# Patient Record
Sex: FEMALE | Race: WHITE | NOT HISPANIC OR LATINO | Employment: OTHER | URBAN - METROPOLITAN AREA
[De-identification: names, ages, dates, MRNs, and addresses within clinical notes are randomized per-mention and may not be internally consistent; named-entity substitution may affect disease eponyms.]

---

## 2017-02-22 ENCOUNTER — GENERIC CONVERSION - ENCOUNTER (OUTPATIENT)
Dept: OTHER | Facility: OTHER | Age: 67
End: 2017-02-22

## 2017-03-05 LAB
ALBUMIN SERPL BCP-MCNC: 3.9 G/DL
ALP SERPL-CCNC: 121 U/L
ALT SERPL W P-5'-P-CCNC: 48 U/L
AST SERPL W P-5'-P-CCNC: 34 U/L
BUN SERPL-MCNC: 21 MG/DL
CALCIUM SERPL-MCNC: 9.6 MG/DL
CHLORIDE SERPL-SCNC: 102 MMOL/L
CHOLEST SERPL-MCNC: 275 MG/DL
CHOLEST/HDLC SERPL: 5.29 {RATIO}
CO2 SERPL-SCNC: 28 MMOL/L
CREAT SERPL-MCNC: 0.78 MG/DL
EST. AVERAGE GLUCOSE BLD GHB EST-MCNC: 148 MG/DL
GLUCOSE (HISTORICAL): 145
HBA1C MFR BLD HPLC: 6.8 %
HDLC SERPL-MCNC: 52 MG/DL
LDL CHOLESTEROL (HISTORICAL): 179
NON-HDL-CHOL (CHOL-HDL) (HISTORICAL): 223
POTASSIUM SERPL-SCNC: 4.3 MMOL/L
SODIUM SERPL-SCNC: 141 MMOL/L
TOTAL PROTEIN (HISTORICAL): 7.3
TRIGL SERPL-MCNC: 219 MG/DL
TSH SERPL DL<=0.05 MIU/L-ACNC: 2.73 M[IU]/L

## 2017-04-17 DIAGNOSIS — Z78.0 ASYMPTOMATIC MENOPAUSAL STATE: ICD-10-CM

## 2017-04-17 DIAGNOSIS — Z12.31 ENCOUNTER FOR SCREENING MAMMOGRAM FOR MALIGNANT NEOPLASM OF BREAST: ICD-10-CM

## 2017-04-18 ENCOUNTER — ALLSCRIPTS OFFICE VISIT (OUTPATIENT)
Dept: OTHER | Facility: OTHER | Age: 67
End: 2017-04-18

## 2017-05-31 ENCOUNTER — GENERIC CONVERSION - ENCOUNTER (OUTPATIENT)
Dept: OTHER | Facility: OTHER | Age: 67
End: 2017-05-31

## 2017-07-26 ENCOUNTER — GENERIC CONVERSION - ENCOUNTER (OUTPATIENT)
Dept: OTHER | Facility: OTHER | Age: 67
End: 2017-07-26

## 2017-09-11 ENCOUNTER — GENERIC CONVERSION - ENCOUNTER (OUTPATIENT)
Dept: OTHER | Facility: OTHER | Age: 67
End: 2017-09-11

## 2017-09-11 ENCOUNTER — ALLSCRIPTS OFFICE VISIT (OUTPATIENT)
Dept: OTHER | Facility: OTHER | Age: 67
End: 2017-09-11

## 2017-12-08 ENCOUNTER — ALLSCRIPTS OFFICE VISIT (OUTPATIENT)
Dept: OTHER | Facility: OTHER | Age: 67
End: 2017-12-08

## 2017-12-09 NOTE — PROGRESS NOTES
Assessment    1  Neck pain (723 1) (M54 2)   2  Acute URI (465 9) (J06 9)   3  Herpes zoster (053 9) (B02 9)    Plan  Neck pain    · Carisoprodol 350 MG Oral Tablet; TAKE 1 TABLET Twice daily PRN   · Lidoderm 5 % External Patch (Lidocaine); APPLY 1 PATCH TO THE AFFECTEDAREA AND LEAVE IN PLACE FOR 12 HOURS, THEN REMOVE AND LEAVE OFF FOR12 HOURS   · Oxycodone-Acetaminophen 5-325 MG Oral Tablet; 1-2 at HS prn    Discussion/Summary    --bilateral posterior neck pain: Symptoms x 4 days  No trauma  May be due to preceding URI with cough  Recommend Rx Soma 350 b i d  p r n , Lidoderm patches  I also gave her a small amount of oxycodone/apap 5/325 (#10)  Recommend ice and heat  Call in 1 week if symptoms are not improved (will sent for x-rays)URI; resolvedshingles: Left-sided forehead  Patient was seen September 11th and given 7 days of antiviral therapy  Symptoms have resolvedfurther problems  Recent shingles   Possible side effects of new medications were reviewed with the patient/guardian today  The treatment plan was reviewed with the patient/guardian  The patient/guardian understands and agrees with the treatment plan      Chief Complaint    1  Neck Pain  Patient presents with c/o neck pain x a few days  History of Present Illness  HPI: 4 day hx of b/l post neck pain, radiating into both her traps  no trauma, but pt recently has gotten over a cold  She also recently had shingles in September      Review of Systems   Constitutional: No fever, no chills, feels well, no tiredness, no recent weight gain or loss  ENT: as noted in HPI  Cardiovascular: no complaints of slow or fast heart rate, no chest pain, no palpitations, no leg claudication or lower extremity edema  Respiratory: no complaints of shortness of breath, no wheezing, no dyspnea on exertion, no orthopnea or PND  Musculoskeletal: as noted in HPI  Active Problems  1  Asymptomatic age-related postmenopausal state (V49 81) (Z78 0)   2   Crohn's disease (555 9) (K50 90)   3  Diabetes mellitus, type 2 (250 00) (E11 9)   4  Encounter for screening mammogram for breast cancer (V76 12) (Z12 31)   5  Esophagitis (530 10) (K20 9)   6  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)   7  Fatigue (780 79) (R53 83)   8  Herpes zoster (053 9) (B02 9)   9  Hyperlipidemia (272 4) (E78 5)   10  Hypomagnesemia (275 2) (E83 42)   11  Inflamed seborrheic keratosis (702 11) (L82 0)   12  Influenza vaccine needed (V04 81) (Z23)   13  Insomnia (780 52) (G47 00)   14  Long term current use of systemic steroids (V58 65) (Z79 52)   15  Low vitamin D level (268 9) (E55 9)   16  Migraine with aura and without status migrainosus, not intractable (346 00) (G43 109)   17  Primary hypothyroidism (244 9) (E03 9)   18  Screening for depression (V79 0) (Z13 89)   19  Screening for other and unspecified genitourinary condition (V81 6) (Z13 89)   20  Sleep disorder (780 50) (G47 9)   21  Special screening for other neurological conditions (V80 09) (Z13 89)   22  Ulcerative colitis (556 9) (K51 90)   23  Vitamin B12 deficiency (266 2) (E53 8)    Past Medical History  1  History of Acute respiratory disease (465 9) (J06 9)   2  History of Acute sinusitis (461 9) (J01 90)   3  History of Acute upper respiratory infection (465 9) (J06 9)   4  History of Cervical adenitis (289 3) (I88 9)   5  History of Chills (780 64) (R68 83)   6  History of Elbow pain, left (719 42) (M25 522)   7  History of Flu-like symptoms (780 99) (R68 89)   8  History of Headache (784 0) (R51)   9  History of acute bronchitis (V12 69) (Z87 09)   10  History of acute bronchitis (V12 69) (Z87 09)   11  History of acute sinusitis (V12 69) (Z87 09)   12  History of headache (V13 89) (Z87 898)   13  History of Hordeolum externum, unspecified laterality (373 11) (H00 019)   14  History of Influenza B (487 1) (J10 1)   15  History of Neck Sprain (847 0)   16   History of Need for immunization against influenza (V04 81) (Z23) 17  History of Need for immunization against influenza (V04 81) (Z23)   18  History of Need for pneumococcal vaccine (V03 82) (Z23)   19  History of Plantar fasciitis of left foot (728 71) (M72 2)   20  History of Prediabetes (790 29) (R73 03)   21  History of Visit for pre-operative examination (V72 84) (Z01 818)   22  History of Visit For:   21  History of Visit For: 4-month Visit (V20 2)   24  History of Wrist Sprain (842 00)  Active Problems And Past Medical History Reviewed: The active problems and past medical history were reviewed and updated today  Family History  Mother    1  Family history of Inflammatory bowel disease  Father    2  Family history of Esophageal stenosis    Social History   · Never A Smoker  The social history was reviewed and updated today  The social history was reviewed and is unchanged  Surgical History    1  History of Cholecystectomy   2  History of Total Abdominal Hysterectomy    Current Meds   1  Butalbital-APAP-Caffeine -40 MG Oral Capsule; TAKE 1 CAPSULE Every 6 hours PRN; Therapy: 58BYT5942 to (Last Rx:26Jun2014) Ordered   2  Esomeprazole Magnesium 40 MG Oral Capsule Delayed Release; Take 1 capsule twice daily; Therapy: 84CEM0923 to (Evaluate:80Lbg3252)  Requested for: 77Hnj0350; Last Rx:26Pmh6451 Ordered   3  Levothyroxine Sodium 50 MCG Oral Tablet; take 1 tablet by mouth daily as directed; Therapy: 35VVY3099 to [phone])  Requested for: 68VXT5769; Last Rx:11Ink7471 Ordered   4  Prevalite 4 GM/DOSE Oral Powder; PLACE CONTENTS OF 1 LEVEL SCOOPFUL IN GLASS  ADD 6 OUNCES OF WATER  STIR TO UNIFORM CONSISTENCY AND DRINK; Therapy: 32WXV4927 to Recorded   5  SUMAtriptan 20 MG/ACT Nasal Solution; USE 1 SPRAY INTO ONE NOSTRIL AS NEEDED FOR MIGRAINE RELIEF, MAY REPEAT ONCE AFTER 2 HOURS  MAX - 40MG/DAY; Therapy: 40UJF9493 to (Last Rx:09Nov2015) Ordered   6   SUMAtriptan Succinate 100 MG Oral Tablet; TALE 1 TABLET BY MOUTH AT ONSET OF MIGRAINE HEADACHE, MAY REPEAT IN 2HOURS (MAX 2 TABS/24 HRS); Therapy: 82RXO6449 to (Nyoka Lawrence)  Requested for: 44Zwp3075; Last Rx:56Jqv2871; Status: ACTIVE - Transmit to Pharmacy - Awaiting Verification Ordered   7  Zolpidem Tartrate 10 MG Oral Tablet; take 1 tablet by mouth every day; Therapy: 39NAW4104 to (NXPFIDGL:61MTA3906)  Requested for: 69BDX5577; Last Rx:23Oct2017 Ordered    The medication list was reviewed and updated today  Allergies  1  Tamiflu   2  Azulfidine TABS   3  [employer] Red Yeast Rice CAPS   4  Latex Exam Gloves MISC   5  Sulfa Drugs   6  Tetracyclines  7  Latex    Vitals   Recorded: [id number] 01:55PM   Temperature 96 F, Tympanic   Heart Rate 105   Pulse Quality Normal   Systolic 051, RUE, Sitting   Diastolic 90, RUE, Sitting   BP CUFF SIZE Large   Height 5 ft    Weight 150 lb 4 oz   BMI Calculated 29 34   BSA Calculated 1 65   O2 Saturation 98, RA       Signatures   Electronically signed by :  Christin Moore DO; Dec  8 2017  2:26PM EST                       (Author)

## 2017-12-18 ENCOUNTER — ALLSCRIPTS OFFICE VISIT (OUTPATIENT)
Dept: OTHER | Facility: OTHER | Age: 67
End: 2017-12-18

## 2017-12-18 DIAGNOSIS — E83.42 HYPOMAGNESEMIA: ICD-10-CM

## 2017-12-18 DIAGNOSIS — E11.9 TYPE 2 DIABETES MELLITUS WITHOUT COMPLICATIONS (HCC): ICD-10-CM

## 2017-12-18 DIAGNOSIS — E78.5 HYPERLIPIDEMIA: ICD-10-CM

## 2017-12-18 DIAGNOSIS — E03.9 HYPOTHYROIDISM: ICD-10-CM

## 2017-12-18 DIAGNOSIS — K50.90 CROHN'S DISEASE WITHOUT COMPLICATION (HCC): ICD-10-CM

## 2017-12-18 DIAGNOSIS — G43.109 MIGRAINE WITH AURA AND WITHOUT STATUS MIGRAINOSUS, NOT INTRACTABLE: ICD-10-CM

## 2017-12-20 NOTE — PROGRESS NOTES
Assessment  1  Acute bronchitis (466 0) (J20 9)    Plan  Acute bronchitis    · LevoFLOXacin 500 MG Oral Tablet; TAKE 1 TABLET DAILY  Crohn's disease, Diabetes mellitus, type 2, Hyperlipidemia, Hypomagnesemia, Migrainewith aura and without status migrainosus, not intractable, Primary hypothyroidism    · (1) CBC/PLT/DIFF; Status:Active; Requested for:38Dcz7639;    · (1) COMPREHENSIVE METABOLIC PANEL; Status:Active; Requested for:24Rgc3837;    · (1) HEMOGLOBIN A1C; Status:Active; Requested for:63Tps0197;    · (1) LIPID PANEL FASTING W DIRECT LDL REFLEX; Status:Active; Requestedfor:83Ibj7129;    · (1) TSH WITH FT4 REFLEX; Status:Active; Requested for:93Hmg0281;     Discussion/Summary    Patient is a 65-year-old female1  Acute bronchitis - start supportive care  Maintain proper hydration  Take over-the-counter Mucinex for symptom relief  Start treatment with Levaquin  Follow up if any symptoms are persisting  Chief Complaint  Patient presents for sore throat, swollen glands and left ear  Patient also C/O rash on her left side x1 week  History of Present Illness  HPI: Patient is a 65-year-old female presents today with CC of sore throat for the past week  Symptoms progressed  She had associated chills, ear discomfort, and swollen glands  She also describes a small rash over her body  Denies any fevers or chills  She states that she has not been taking anything for her symptoms  Review of Systems   Constitutional: not feeling poorly-- and-- not feeling tired  ENT: no sore throat-- and-- no nasal discharge  Cardiovascular: no chest pain-- and-- no palpitations  Respiratory: no cough-- and-- no shortness of breath during exertion  Gastrointestinal: no abdominal pain,-- no nausea-- and-- no diarrhea  Genitourinary: no pelvic pain-- and-- no dysmenorrhea  Musculoskeletal: no joint swelling-- and-- no joint stiffness  Integumentary: no itching-- and-- no skin wound    Neurological: no numbness-- and-- no dizziness  Active Problems  1  Acute URI (465 9) (J06 9)   2  Asymptomatic age-related postmenopausal state (V49 81) (Z78 0)   3  Crohn's disease (555 9) (K50 90)   4  Diabetes mellitus, type 2 (250 00) (E11 9)   5  Encounter for screening mammogram for breast cancer (V76 12) (Z12 31)   6  Esophagitis (530 10) (K20 9)   7  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)   8  Fatigue (780 79) (R53 83)   9  Herpes zoster (053 9) (B02 9)   10  Hyperlipidemia (272 4) (E78 5)   11  Hypomagnesemia (275 2) (E83 42)   12  Inflamed seborrheic keratosis (702 11) (L82 0)   13  Influenza vaccine needed (V04 81) (Z23)   14  Insomnia (780 52) (G47 00)   15  Long term current use of systemic steroids (V58 65) (Z79 52)   16  Low vitamin D level (268 9) (E55 9)   17  Migraine with aura and without status migrainosus, not intractable (346 00) (G43 109)   18  Neck pain (723 1) (M54 2)   19  Primary hypothyroidism (244 9) (E03 9)   20  Screening for depression (V79 0) (Z13 89)   21  Screening for other and unspecified genitourinary condition (V81 6) (Z13 89)   22  Sleep disorder (780 50) (G47 9)   23  Special screening for other neurological conditions (V80 09) (Z13 89)   24  Ulcerative colitis (556 9) (K51 90)   25  Vitamin B12 deficiency (266 2) (E53 8)    Past Medical History  1  History of Acute respiratory disease (465 9) (J06 9)   2  History of Acute sinusitis (461 9) (J01 90)   3  History of Acute upper respiratory infection (465 9) (J06 9)   4  History of Cervical adenitis (289 3) (I88 9)   5  History of Chills (780 64) (R68 83)   6  History of Elbow pain, left (719 42) (M25 522)   7  History of Flu-like symptoms (780 99) (R68 89)   8  History of Headache (784 0) (R51)   9  History of acute bronchitis (V12 69) (Z87 09)   10  History of acute bronchitis (V12 69) (Z87 09)   11  History of acute sinusitis (V12 69) (Z87 09)   12  History of headache (V13 89) (Z87 898)   13   History of Hordeolum externum, unspecified laterality (373 11) (H00 019)   14  History of Influenza B (487 1) (J10 1)   15  History of Neck Sprain (847 0)   16  History of Need for immunization against influenza (V04 81) (Z23)   17  History of Need for immunization against influenza (V04 81) (Z23)   18  History of Need for pneumococcal vaccine (V03 82) (Z23)   19  History of Plantar fasciitis of left foot (728 71) (M72 2)   20  History of Prediabetes (790 29) (R73 03)   21  History of Visit for pre-operative examination (V72 84) (Z01 818)   22  History of Visit For:   21  History of Visit For: 4-month Visit (V20 2)   24  History of Wrist Sprain (842 00)  Active Problems And Past Medical History Reviewed: The active problems and past medical history were reviewed and updated today  Family History  Mother    1  Family history of Inflammatory bowel disease  Father    2  Family history of Esophageal stenosis  Family History Reviewed: The family history was reviewed and updated today  Social History   · Never A Smoker  The social history was reviewed and updated today  The social history was reviewed and is unchanged  Surgical History  1  History of Cholecystectomy   2  History of Total Abdominal Hysterectomy  Surgical History Reviewed: The surgical history was reviewed and updated today  Current Meds   1  Butalbital-APAP-Caffeine -40 MG Oral Capsule; TAKE 1 CAPSULE Every 6 hours PRN; Therapy: 45CQC2849 to (Last Rx:26Jun2014) Ordered   2  Esomeprazole Magnesium 40 MG Oral Capsule Delayed Release; Take 1 capsule twice daily; Therapy: 20SEE9126 to (Evaluate:40Uxd5119)  Requested for: 38Qcy3220; Last Rx:01Xxc3166 Ordered   3  Levothyroxine Sodium 50 MCG Oral Tablet; take 1 tablet by mouth daily as directed; Therapy: 98UYL7185 to )  Requested for: 86IAY8811; Last Rx:22Oct2017 Ordered   4   Lidoderm 5 % External Patch; APPLY 1 PATCH TO THE AFFECTED AREA AND LEAVE IN PLACE FOR 12 HOURS, THEN REMOVE AND LEAVE OFF FOR 12 HOURS; Therapy: 73RVH0433 to (Evaluate:31Psm4894); Last Rx:43Fmk4942 Ordered   5  Oxycodone-Acetaminophen 5-325 MG Oral Tablet; 1-2 at HS prn; Therapy: 00RVC5682 to (Last Rx:02Zct3820) Ordered   6  Prevalite 4 GM/DOSE Oral Powder; PLACE CONTENTS OF 1 LEVEL SCOOPFUL IN GLASS  ADD 6 OUNCES OF WATER  STIR TO UNIFORM CONSISTENCY AND DRINK; Therapy: 23TRE3736 to Recorded   7  SUMAtriptan 20 MG/ACT Nasal Solution; USE 1 SPRAY INTO ONE NOSTRIL AS NEEDED FOR MIGRAINE RELIEF, MAY REPEAT ONCE AFTER 2 HOURS  MAX - 40MG/DAY; Therapy: 47QKM3398 to (Last Rx:09Nov2015) Ordered   8  SUMAtriptan Succinate 100 MG Oral Tablet; TALE 1 TABLET BY MOUTH AT ONSET OF MIGRAINE HEADACHE, MAY REPEAT IN 2HOURS (MAX 2 TABS/24 HRS); Therapy: 12YDX8068 to (Jaime Kerr)  Requested for: 02Isx7390; Last Rx:90Sav4594; Status: ACTIVE - Transmit to Pharmacy - Awaiting Verification Ordered   9  TiZANidine HCl - 2 MG Oral Tablet; Take 1 tablet twice daily; Therapy: 64Tyq4388 to (Evaluate:11Jan2018)  Requested for: 16Zpd0709; Last Rx:27Iix5562 Ordered   10  Zolpidem Tartrate 10 MG Oral Tablet; take 1 tablet by mouth every day; Therapy: 17WQD0674 to (JIHQVXGT:56RRD5051)  Requested for: 90XKM8218; Last  Rx:60Hia8230 Ordered    The medication list was reviewed and updated today  Allergies  1  Tamiflu   2  Azulfidine TABS   3  [employer] Red Yeast Rice CAPS   4  Latex Exam Gloves MISC   5  Sulfa Drugs   6  Tetracyclines  7  Latex    Vitals   Recorded: 23GDI3670 11:01AM   Temperature 98 3 F, Tympanic   Heart Rate 90   Pulse Quality Normal   Respiration Quality Normal   Respiration 16   Systolic 524, RUE, Sitting   Diastolic 78, RUE, Sitting   Height 5 ft    Weight 149 lb 6 oz   BMI Calculated 29 17   BSA Calculated 1 65   O2 Saturation 98, RA   Pain Scale 0     Physical Exam   Constitutional  General appearance: No acute distress, well appearing and well nourished  Eyes  Conjunctiva and lids: No swelling, erythema or discharge     Pupils and irises: Equal, round and reactive to light  Ears, Nose, Mouth, and Throat  External inspection of ears and nose: Normal    Nasal mucosa, septum, and turbinates: Normal without edema or erythema  Oropharynx: Normal with no erythema, edema, exudate or lesions  Pulmonary  Respiratory effort: No increased work of breathing or signs of respiratory distress  Auscultation of lungs: Clear to auscultation  Cardiovascular  Auscultation of heart: Normal rate and rhythm, normal S1 and S2, without murmurs  Examination of extremities for edema and/or varicosities: Normal    Abdomen  Abdomen: Non-tender, no masses  Liver and spleen: No hepatomegaly or splenomegaly  Lymphatic  Palpation of lymph nodes in neck: No lymphadenopathy  Musculoskeletal  Gait and station: Normal    Inspection/palpation of joints, bones, and muscles: Normal    Skin  Skin and subcutaneous tissue: Normal without rashes or lesions  Signatures   Electronically signed by :  Reggie Lopez DO; Dec 19 2017  1:53PM EST                       (Author)

## 2018-01-12 VITALS
HEIGHT: 60 IN | BODY MASS INDEX: 31.24 KG/M2 | DIASTOLIC BLOOD PRESSURE: 92 MMHG | WEIGHT: 159.13 LBS | OXYGEN SATURATION: 97 % | TEMPERATURE: 98.5 F | RESPIRATION RATE: 16 BRPM | SYSTOLIC BLOOD PRESSURE: 148 MMHG | HEART RATE: 109 BPM

## 2018-01-13 VITALS
SYSTOLIC BLOOD PRESSURE: 166 MMHG | OXYGEN SATURATION: 98 % | RESPIRATION RATE: 16 BRPM | BODY MASS INDEX: 29.95 KG/M2 | HEIGHT: 60 IN | HEART RATE: 105 BPM | WEIGHT: 152.56 LBS | TEMPERATURE: 98.4 F | DIASTOLIC BLOOD PRESSURE: 108 MMHG

## 2018-01-16 NOTE — RESULT NOTES
Verified Results  XR ELBOW 2 VIEW LEFT [id number] 09:10AM [facility] Order Number: [id number]     Test Name Result Flag Reference   XR ELBOW 2 VW LEFT (Report)     LEFT ELBOW     INDICATION: Left elbow pain  COMPARISON: None     VIEWS: 2; 2 images     FINDINGS:     There is no acute fracture or dislocation  There is no joint effusion  No degenerative changes  No lytic or blastic lesions are seen  Soft tissues are unremarkable  IMPRESSION:     No acute osseous abnormality         Workstation performed: [id number]     Signed by:   Joe Sanchez MD   6/8/16

## 2018-01-22 VITALS
BODY MASS INDEX: 29.33 KG/M2 | RESPIRATION RATE: 16 BRPM | WEIGHT: 149.38 LBS | OXYGEN SATURATION: 98 % | DIASTOLIC BLOOD PRESSURE: 78 MMHG | HEART RATE: 90 BPM | HEIGHT: 60 IN | TEMPERATURE: 98.3 F | SYSTOLIC BLOOD PRESSURE: 122 MMHG

## 2018-01-22 VITALS
BODY MASS INDEX: 29.5 KG/M2 | OXYGEN SATURATION: 98 % | WEIGHT: 150.25 LBS | HEIGHT: 60 IN | HEART RATE: 105 BPM | DIASTOLIC BLOOD PRESSURE: 90 MMHG | SYSTOLIC BLOOD PRESSURE: 142 MMHG | TEMPERATURE: 96 F

## 2018-01-23 ENCOUNTER — ALLSCRIPTS OFFICE VISIT (OUTPATIENT)
Dept: OTHER | Facility: OTHER | Age: 68
End: 2018-01-23

## 2018-01-24 NOTE — PROGRESS NOTES
Assessment   1  Acute bronchitis (466 0) (J20 9)   2  Diabetes mellitus, type 2 (250 00) (E11 9)   3  Neck pain (723 1) (M54 2)   4  Nausea (787 02) (R11 0)   5  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)   6  Primary hypothyroidism (244 9) (E03 9)    Plan   Acute bronchitis    · Benzonatate 200 MG Oral Capsule; TAKE 1 CAPSULE 3 TIMES DAILY AS    NEEDED   · Cefuroxime Axetil 500 MG Oral Tablet; TAKE 1 TABLET 2 TIMES DAILY AFTER    MEALS  Nausea    · Ondansetron 4 MG Oral Tablet Disintegrating; Dissolve one tablet in mouth three    times daily as needed  Neck pain    · Carisoprodol 350 MG Oral Tablet; TAKE 1 TABLET 3 TIMES DAILY AS NEEDED    Discussion/Summary      Discussed OTC cold meds  I prescribed her Zofran to take PRN for nausea  Care, ER instructions given  5-7 days if not resolved  refilled Carisoprodol for pt  today at her request   pt's recent FBW results  , A1C 6 2, both reflecting good diabetic control with diet  Her lipids have improved overall but still not near goal  Pt is averse to taking prescription cholesterol medication  I told her we can give it another 4-6 months to see if her numbers further improve and, if not, will need to have further discussion  Her TSH and rest of labs look okay  She is to continue her chronic meds at current doses  Possible side effects of new medications were reviewed with the patient/guardian today  The treatment plan was reviewed with the patient/guardian  The patient/guardian understands and agrees with the treatment plan      Chief Complaint   Pt presents with possible bronchitis x 4 days  Pt would like a Rx for Carisoprodol 350 mg  History of Present Illness   HPI: Pt  presents with a 4 day history of fatigue, dry cough with intermittent productivity brought out by an expectorant, mild nasal congestion  Denies ST  Had fever and chills which did resolve as well as body aches   Denies vomiting but has had some nausea and loose stools for which she took Imodium  She has also taken Tylenol for fever  Denies hx of asthma/allergies  Does not smoke  She is due for Pneumovax-23  She has not reviewed recent FBW that was drawn last month  Review of Systems        Constitutional: as noted in HPI       ENT: as noted in HPI  Cardiovascular: no complaints of slow or fast heart rate, no chest pain, no palpitations, no leg claudication or lower extremity edema  Respiratory: as noted in HPI  Gastrointestinal: as noted in HPI  Genitourinary: no complaints of dysuria, no incontinence, no pelvic pain, no dysmenorrhea, no vaginal discharge or abnormal vaginal bleeding  Active Problems   1  Acute bronchitis (466 0) (J20 9)   2  Asymptomatic age-related postmenopausal state (V49 81) (Z78 0)   3  Crohn's disease (555 9) (K50 90)   4  Diabetes mellitus, type 2 (250 00) (E11 9)   5  Encounter for screening mammogram for breast cancer (V76 12) (Z12 31)   6  Esophagitis (530 10) (K20 9)   7  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)   8  Fatigue (780 79) (R53 83)   9  Herpes zoster (053 9) (B02 9)   10  Hypomagnesemia (275 2) (E83 42)   11  Inflamed seborrheic keratosis (702 11) (L82 0)   12  Influenza vaccine needed (V04 81) (Z23)   13  Insomnia (780 52) (G47 00)   14  Long term current use of systemic steroids (V58 65) (Z79 52)   15  Low vitamin D level (268 9) (E55 9)   16  Migraine with aura and without status migrainosus, not intractable (346 00) (G43 109)   17  Neck pain (723 1) (M54 2)   18  Primary hypothyroidism (244 9) (E03 9)   19  Screening for depression (V79 0) (Z13 89)   20  Screening for other and unspecified genitourinary condition (V81 6) (Z13 89)   21  Sleep disorder (780 50) (G47 9)   22  Special screening for other neurological conditions (V80 09) (Z13 89)   23  Ulcerative colitis (556 9) (K51 90)   24  Vitamin B12 deficiency (266 2) (E53 8)    Past Medical History   1   History of Acute respiratory disease (465 9) (J06 9) 2  History of Acute sinusitis (461 9) (J01 90)   3  History of Acute upper respiratory infection (465 9) (J06 9)   4  History of Cervical adenitis (289 3) (I88 9)   5  History of Chills (780 64) (R68 83)   6  History of Elbow pain, left (719 42) (M25 522)   7  History of Flu-like symptoms (780 99) (R68 89)   8  History of Headache (784 0) (R51)   9  History of acute bronchitis (V12 69) (Z87 09)   10  History of acute bronchitis (V12 69) (Z87 09)   11  History of acute sinusitis (V12 69) (Z87 09)   12  History of headache (V13 89) (Z87 898)   13  History of Hordeolum externum, unspecified laterality (373 11) (H00 019)   14  History of Influenza B (487 1) (J10 1)   15  History of Neck Sprain (847 0)   16  History of Need for immunization against influenza (V04 81) (Z23)   17  History of Need for immunization against influenza (V04 81) (Z23)   18  History of Need for pneumococcal vaccine (V03 82) (Z23)   19  History of Plantar fasciitis of left foot (728 71) (M72 2)   20  History of Prediabetes (790 29) (R73 03)   21  History of Visit for pre-operative examination (V72 84) (Z01 818)   22  History of Visit For:   21  History of Visit For: 4-month Visit (V20 2)   24  History of Wrist Sprain (842 00)    Family History   Mother    1  Family history of Inflammatory bowel disease  Father    2  Family history of Esophageal stenosis    Social History    · Never A Smoker  The social history was reviewed and is unchanged  Surgical History   1  History of Cholecystectomy   2  History of Total Abdominal Hysterectomy    Current Meds    1  Butalbital-APAP-Caffeine -40 MG Oral Capsule; TAKE 1 CAPSULE Every 6 hours     PRN; Therapy: 20GRC0572 to (Last Rx:26Jun2014) Ordered   2  Esomeprazole Magnesium 40 MG Oral Capsule Delayed Release; Take 1 capsule twice     daily; Therapy: 35NHF2924 to (Evaluate:17Yer0169)  Requested for: 01Ldn2735; Last     Rx:[id number] Ordered   3   Levothyroxine Sodium 50 MCG Oral Tablet; take 1 tablet by mouth daily as directed; Therapy: 57SCD0794 to [phone])  Requested for: 40XUV3197; Last     Rx:[id number] Ordered   4  Lidoderm 5 % External Patch; APPLY 1 PATCH TO THE AFFECTED AREA AND LEAVE     IN PLACE FOR 12 HOURS, THEN REMOVE AND LEAVE OFF FOR 12 HOURS; Therapy: 39UZT5070 to (Evaluate:90Jmx9534); Last Rx:[id number] Ordered   5  Prevalite 4 GM/DOSE Oral Powder; PLACE CONTENTS OF 1 LEVEL SCOOPFUL IN     GLASS  ADD 6 OUNCES OF WATER  STIR TO UNIFORM CONSISTENCY AND     DRINK; Therapy: 19BHW6229 to Recorded   6  Zolpidem Tartrate 10 MG Oral Tablet; take 1 tablet by mouth every day; Therapy: 21HXZ6616 to (UBIIUTUN:46RTM1227)  Requested for: 63CIP2816; Last     Rx:[id number] Ordered     The medication list was reviewed and updated today  Allergies   1  Tamiflu   2  Azulfidine TABS   3  [employer] Red Yeast Rice CAPS   4  Latex Exam Gloves MISC   5  Sulfa Drugs   6  Tetracyclines  7  Latex    Vitals    Recorded: 62RAM2524 11:19AM   Temperature 96 3 F, Tympanic   Heart Rate 86   Pulse Quality Normal   Respiration Quality Normal   Respiration 18   Systolic 711, LUE, Sitting   Diastolic 86, LUE, Sitting   Height 5 ft 1 in   Weight 145 lb 5 oz   BMI Calculated 27 46   BSA Calculated 1 65   O2 Saturation 97, RA   Pain Scale 0     Physical Exam        Constitutional      General appearance: No acute distress, well appearing and well nourished  Ears, Nose, Mouth, and Throat      External inspection of ears and nose: Normal        Otoscopic examination: Tympanic membranes translucent with normal light reflex  Canals patent without erythema  Nasal mucosa, septum, and turbinates: Abnormal  -- B/L boggy turbinates  Oropharynx: Abnormal  -- PND; no exudates  Pulmonary      Respiratory effort: No increased work of breathing or signs of respiratory distress  Auscultation of lungs: Abnormal  -- B/L diffuse coarse rhonchi heard throughout; no significant wheezes  Cardiovascular      Auscultation of heart: Normal rate and rhythm, normal S1 and S2, without murmurs  Lymphatic      Palpation of lymph nodes in neck: No lymphadenopathy  Psychiatric      Orientation to person, place, and time: Normal        Mood and affect: Normal        Additional Exam:  Vital signs were reviewed; neck supple  Results/Data   (1) CBC/PLT/DIFF 28MGR0794 11:26AM Areli Antonio      Test Name Result Flag Reference   WBC COUNT 8 0     RBC COUNT 4 38     HEMOGLOBIN 14 5     HEMATOCRIT 41 2     MCV 94     MCH 33 1     MCHC 35 2     RDW 12 8     MPV 8 1     PLATELET COUNT 220 H       (1) COMPREHENSIVE METABOLIC PANEL 29OPS3242 81:35BS Abdelaal, Ihab      Test Name Result Flag Reference   GLUCOSE,RANDM 119 H    SODIUM 134 L    POTASSIUM 5 2     CHLORIDE 99 L    CARBON DIOXIDE 25     ANION GAP (CALC) 10     BLOOD UREA NITROGEN 14     CREATININE 0 86     CALCIUM 9 8     BILI, TOTAL 0 4     ALK PHOSPHATAS 122 H    ALT (SGPT) 32     AST(SGOT) 27        Summary / No summary entered :        No summary entered  Documents attached :        [address] Work - Manjinder Burciaga; Enc: [id number] - CDA - - (Unassigned) (Additional        Information Document)  (1) LIPID PANEL FASTING W DIRECT LDL REFLEX 87ZXU1720 11:26AM Nell, Brenda      Test Name Result Flag Reference   CHOLESTEROL 253 H    LDL CHOLESTEROL CALCULATED 163 H    TRIGLYCERIDES 185 H    HDL,DIRECT 53        (1) HEMOGLOBIN A1C 10PMU6673 11:26AM Nell, Brooklynnab      Test Name Result Flag Reference   HEMOGLOBIN A1C 6 2 H    EST  AVG  GLUCOSE 131        (1) TSH WITH FT4 REFLEX [id number] 11:26AM Areli Antonio      Test Name Result Flag Reference   TSH 2 94        Signatures    Electronically signed by : Sandeep Pace, [facility]; Jan 23 2018  2:59PM EST                       (Author)     Electronically signed by :  Lela Jenkins DO; Jan 23 2018  3:15PM EST                       (Author)

## 2018-02-12 ENCOUNTER — TELEPHONE (OUTPATIENT)
Dept: FAMILY MEDICINE CLINIC | Facility: CLINIC | Age: 68
End: 2018-02-12

## 2018-02-12 ENCOUNTER — HOSPITAL ENCOUNTER (OUTPATIENT)
Dept: BONE DENSITY | Facility: MEDICAL CENTER | Age: 68
Discharge: HOME | End: 2018-02-12
Payer: MEDICARE

## 2018-02-12 ENCOUNTER — HOSPITAL ENCOUNTER (OUTPATIENT)
Dept: MAMMOGRAPHY | Facility: MEDICAL CENTER | Age: 68
Discharge: HOME | End: 2018-02-12
Payer: MEDICARE

## 2018-02-12 DIAGNOSIS — G47.9 SLEEP DISORDER: Primary | ICD-10-CM

## 2018-02-12 DIAGNOSIS — Z12.31 ENCOUNTER FOR SCREENING MAMMOGRAM FOR MALIGNANT NEOPLASM OF BREAST: ICD-10-CM

## 2018-02-12 DIAGNOSIS — J20.9 ACUTE BRONCHITIS, UNSPECIFIED ORGANISM: Primary | ICD-10-CM

## 2018-02-12 DIAGNOSIS — Z78.0 ASYMPTOMATIC MENOPAUSAL STATE: ICD-10-CM

## 2018-02-12 PROCEDURE — 77067 SCR MAMMO BI INCL CAD: CPT

## 2018-02-12 PROCEDURE — 77080 DXA BONE DENSITY AXIAL: CPT

## 2018-02-12 RX ORDER — ZOLPIDEM TARTRATE 10 MG/1
1 TABLET ORAL DAILY
COMMUNITY
Start: 2013-10-28 | End: 2018-02-12 | Stop reason: SDUPTHER

## 2018-02-12 RX ORDER — ZOLPIDEM TARTRATE 10 MG/1
10 TABLET ORAL DAILY
Qty: 90 TABLET | Refills: 0 | OUTPATIENT
Start: 2018-02-12 | End: 2018-02-21 | Stop reason: SDUPTHER

## 2018-02-12 RX ORDER — AZITHROMYCIN 250 MG/1
TABLET, FILM COATED ORAL
Qty: 6 TABLET | Refills: 0 | Status: SHIPPED | OUTPATIENT
Start: 2018-02-12 | End: 2018-02-16

## 2018-02-12 NOTE — TELEPHONE ENCOUNTER
Pt said she is not feeling any better not any worse he said you said you would call her in something if she wasn't getting betters she felt she was getting better on the antiobotic but after a couple of days she felt it went back to before

## 2018-02-12 NOTE — TELEPHONE ENCOUNTER
I will send her in a second abx  If not better in 5-7 days, she will need to be seen for F/U  Based on the last fill date, she is 1 week early for her Ambien  She needs to call back next week to get this refilled

## 2018-02-21 DIAGNOSIS — G47.9 SLEEP DISORDER: ICD-10-CM

## 2018-02-22 RX ORDER — ZOLPIDEM TARTRATE 10 MG/1
10 TABLET ORAL DAILY
Qty: 90 TABLET | Refills: 0 | OUTPATIENT
Start: 2018-02-22 | End: 2018-03-22 | Stop reason: CLARIF

## 2018-02-27 ENCOUNTER — TELEPHONE (OUTPATIENT)
Dept: FAMILY MEDICINE CLINIC | Facility: CLINIC | Age: 68
End: 2018-02-27

## 2018-02-27 NOTE — TELEPHONE ENCOUNTER
Call patient, her bone density scan showed osteoporosis    Recommend nonurgent appointment to discuss  treatment options

## 2018-02-28 NOTE — TELEPHONE ENCOUNTER
I SPOKE TO PT AND SHE WILL MAKE AN APPT TO DISCUSS  SHE WAS HESITANT ABOUT TRYING MEDS BECAUSE OF THE SIDE EFFECTS  I DID ADVISE HER OF THE SIDE EFFECTS OF HAVING OSTEOPOROSIS AND NOT TREATING IT AND BEING AT RISK OF FRACTURES

## 2018-03-22 ENCOUNTER — TELEPHONE (OUTPATIENT)
Dept: FAMILY MEDICINE CLINIC | Facility: CLINIC | Age: 68
End: 2018-03-22

## 2018-03-22 DIAGNOSIS — G47.00 INSOMNIA, UNSPECIFIED TYPE: Primary | ICD-10-CM

## 2018-03-22 RX ORDER — RAMELTEON 8 MG/1
8 TABLET ORAL
Qty: 30 TABLET | Refills: 0 | OUTPATIENT
Start: 2018-03-22 | End: 2018-12-27

## 2018-03-22 NOTE — TELEPHONE ENCOUNTER
I approved Rx to be phoned in  Can you refer to her chart and call the Rx into her pharmacy? Thanks

## 2018-03-26 ENCOUNTER — TELEPHONE (OUTPATIENT)
Dept: FAMILY MEDICINE CLINIC | Facility: CLINIC | Age: 68
End: 2018-03-26

## 2018-04-18 DIAGNOSIS — E03.9 HYPOTHYROIDISM, UNSPECIFIED TYPE: Primary | ICD-10-CM

## 2018-04-18 RX ORDER — TIZANIDINE 2 MG/1
1 TABLET ORAL 2 TIMES DAILY
COMMUNITY
Start: 2017-12-12 | End: 2019-04-11

## 2018-04-18 RX ORDER — BENZONATATE 200 MG/1
1 CAPSULE ORAL 3 TIMES DAILY PRN
COMMUNITY
Start: 2018-01-23 | End: 2018-12-27

## 2018-04-18 RX ORDER — LEVOFLOXACIN 500 MG/1
1 TABLET, FILM COATED ORAL DAILY
COMMUNITY
Start: 2017-12-18 | End: 2018-12-27

## 2018-04-18 RX ORDER — ONDANSETRON 4 MG/1
1 TABLET, FILM COATED ORAL 3 TIMES DAILY
COMMUNITY
Start: 2016-08-12

## 2018-04-18 RX ORDER — LIDOCAINE 50 MG/G
1 PATCH TOPICAL
COMMUNITY
Start: 2017-12-08

## 2018-04-18 RX ORDER — BUTALBITAL, ACETAMINOPHEN AND CAFFEINE 50; 325; 40 MG/1; MG/1; MG/1
1 CAPSULE ORAL EVERY 6 HOURS PRN
COMMUNITY
Start: 2013-12-17 | End: 2019-04-11

## 2018-04-18 RX ORDER — SUMATRIPTAN 20 MG/1
1 SPRAY NASAL
COMMUNITY
Start: 2015-11-09 | End: 2019-04-11

## 2018-04-18 RX ORDER — SUMATRIPTAN 100 MG/1
TABLET, FILM COATED ORAL
COMMUNITY
Start: 2011-07-01 | End: 2018-06-04 | Stop reason: SDUPTHER

## 2018-04-18 RX ORDER — CARISOPRODOL 350 MG/1
1 TABLET ORAL 3 TIMES DAILY PRN
COMMUNITY
Start: 2018-01-23 | End: 2019-04-11

## 2018-04-18 RX ORDER — LEVOTHYROXINE SODIUM 0.05 MG/1
1 TABLET ORAL DAILY
COMMUNITY
Start: 2011-04-05 | End: 2018-04-18 | Stop reason: SDUPTHER

## 2018-04-18 RX ORDER — OXYCODONE HYDROCHLORIDE AND ACETAMINOPHEN 5; 325 MG/1; MG/1
TABLET ORAL
COMMUNITY
Start: 2017-12-08 | End: 2019-01-24

## 2018-04-18 RX ORDER — DIPHENOXYLATE HYDROCHLORIDE AND ATROPINE SULFATE 2.5; .025 MG/1; MG/1
1 TABLET ORAL 4 TIMES DAILY PRN
COMMUNITY
Start: 2014-07-30 | End: 2019-04-11

## 2018-04-18 RX ORDER — ESOMEPRAZOLE MAGNESIUM 40 MG/1
1 CAPSULE, DELAYED RELEASE ORAL 2 TIMES DAILY
COMMUNITY
Start: 2011-04-03 | End: 2020-10-07 | Stop reason: SDUPTHER

## 2018-04-18 RX ORDER — TOBRAMYCIN 3 MG/ML
1 SOLUTION/ DROPS OPHTHALMIC 4 TIMES DAILY
COMMUNITY
Start: 2014-08-19 | End: 2019-04-11

## 2018-04-19 RX ORDER — LEVOTHYROXINE SODIUM 0.05 MG/1
50 TABLET ORAL DAILY
Qty: 30 TABLET | Refills: 0 | Status: SHIPPED | OUTPATIENT
Start: 2018-04-19 | End: 2018-04-20 | Stop reason: SDUPTHER

## 2018-04-20 DIAGNOSIS — E03.9 HYPOTHYROIDISM, UNSPECIFIED TYPE: ICD-10-CM

## 2018-04-20 RX ORDER — LEVOTHYROXINE SODIUM 0.05 MG/1
TABLET ORAL
Qty: 90 TABLET | Refills: 0 | Status: SHIPPED | OUTPATIENT
Start: 2018-04-20 | End: 2018-08-10 | Stop reason: SDUPTHER

## 2018-06-02 DIAGNOSIS — G47.9 SLEEP DISORDER: Primary | ICD-10-CM

## 2018-06-03 RX ORDER — ZOLPIDEM TARTRATE 10 MG/1
10 TABLET ORAL
Qty: 90 TABLET | Refills: 1 | Status: SHIPPED | OUTPATIENT
Start: 2018-06-03 | End: 2018-08-21 | Stop reason: SDUPTHER

## 2018-06-04 DIAGNOSIS — G43.809 OTHER MIGRAINE WITHOUT STATUS MIGRAINOSUS, NOT INTRACTABLE: Primary | ICD-10-CM

## 2018-06-14 RX ORDER — SUMATRIPTAN 100 MG/1
100 TABLET, FILM COATED ORAL ONCE AS NEEDED
Qty: 27 TABLET | Refills: 0 | Status: SHIPPED | OUTPATIENT
Start: 2018-06-14 | End: 2018-12-27 | Stop reason: SDUPTHER

## 2018-06-14 NOTE — TELEPHONE ENCOUNTER
Pt had called multiple times in RX line for medications send to [facility]   thank you

## 2018-08-05 ENCOUNTER — OFFICE VISIT (OUTPATIENT)
Dept: FAMILY MEDICINE CLINIC | Facility: CLINIC | Age: 68
End: 2018-08-05
Payer: MEDICARE

## 2018-08-05 VITALS
BODY MASS INDEX: 29.82 KG/M2 | OXYGEN SATURATION: 95 % | TEMPERATURE: 97.9 F | SYSTOLIC BLOOD PRESSURE: 156 MMHG | DIASTOLIC BLOOD PRESSURE: 84 MMHG | HEART RATE: 118 BPM | WEIGHT: 152.7 LBS

## 2018-08-05 DIAGNOSIS — R51.9 FACIAL PAIN: Primary | ICD-10-CM

## 2018-08-05 PROCEDURE — 99214 OFFICE O/P EST MOD 30 MIN: CPT | Performed by: FAMILY MEDICINE

## 2018-08-05 RX ORDER — GABAPENTIN 100 MG/1
100 CAPSULE ORAL 2 TIMES DAILY
Qty: 60 CAPSULE | Refills: 0 | Status: SHIPPED | OUTPATIENT
Start: 2018-08-05 | End: 2018-09-01 | Stop reason: SDUPTHER

## 2018-08-05 RX ORDER — ACYCLOVIR 400 MG/1
400 TABLET ORAL
Refills: 0 | Status: CANCELLED | OUTPATIENT
Start: 2018-08-05

## 2018-08-05 RX ORDER — CHOLESTYRAMINE 4 G/5.5G
POWDER, FOR SUSPENSION ORAL
Refills: 3 | COMMUNITY
Start: 2018-06-05

## 2018-08-05 RX ORDER — ACYCLOVIR 50 MG/G
OINTMENT TOPICAL EVERY 4 HOURS PRN
Qty: 15 G | Refills: 0 | Status: SHIPPED | OUTPATIENT
Start: 2018-08-05 | End: 2018-12-27

## 2018-08-05 RX ORDER — VALACYCLOVIR HYDROCHLORIDE 1 G/1
1000 TABLET, FILM COATED ORAL 3 TIMES DAILY
Qty: 21 TABLET | Refills: 0 | Status: SHIPPED | OUTPATIENT
Start: 2018-08-05 | End: 2018-08-12

## 2018-08-05 NOTE — PROGRESS NOTES
Assessment/Plan:   1  Facial pain  Reviewed patient's symptoms today  She is concerned as this may be the 7th episode of shingles she has developed  She was educated on the pathophysiology of this problem  At this time, it is unlikely a recurrence of her shingles virus  Given her current symptoms as well as her believe, will refer patient to Dermatology to further evaluate this problem  She was advised today that shingles unlikely develops in multiple locations  She will likely have 1 dermatomal area affected  Her symptoms may very well be likely secondary to possible neuropathy/trigeminal neuropathy/atypical facial pain  Will treat patient will valacyclovir 1 tab t i d  for 7 days  Will also start treatment with gabapentin 100 milligrams q h s   If any of her symptoms should worsen or persist, she was advised to call immediately  - Ambulatory referral to Dermatology; Future  - gabapentin (NEURONTIN) 100 mg capsule; Take 1 capsule (100 mg total) by mouth 2 (two) times a day  Dispense: 60 capsule; Refill: 0  - acyclovir (ZOVIRAX) 5 % ointment; Apply topically every 4 (four) hours as needed (Skin irritation)  Dispense: 15 g; Refill: 0  - valACYclovir (VALTREX) 1,000 mg tablet; Take 1 tablet (1,000 mg total) by mouth 3 (three) times a day for 7 days  Dispense: 21 tablet; Refill: 0     Diagnoses and all orders for this visit:    Facial pain  -     Ambulatory referral to Dermatology; Future  -     gabapentin (NEURONTIN) 100 mg capsule; Take 1 capsule (100 mg total) by mouth 2 (two) times a day  -     acyclovir (ZOVIRAX) 5 % ointment; Apply topically every 4 (four) hours as needed (Skin irritation)  -     valACYclovir (VALTREX) 1,000 mg tablet; Take 1 tablet (1,000 mg total) by mouth 3 (three) times a day for 7 days    Other orders  -     Cancel: acyclovir (ZOVIRAX) 400 MG tablet;  Take 1 tablet (400 mg total) by mouth every 4 (four) hours while awake          Subjective:    Chief Complaint   Patient presents with    Rash     forehead and L breast        Patient ID: Beka Torres is a 75 y o  female  Rash   This is a recurrent problem  The problem is unchanged  The affected locations include the face, scalp and torso  The rash is characterized by burning  She was exposed to nothing  Pertinent negatives include no anorexia, congestion, cough, diarrhea, eye pain, facial edema, fatigue, fever, shortness of breath or sore throat  Treatments tried: Zovirax ointment  The treatment provided no relief  Review of Systems   Constitutional: Negative for activity change, chills, fatigue and fever  HENT: Negative for congestion, ear pain, sinus pressure and sore throat  Eyes: Negative for pain, redness, itching and visual disturbance  Respiratory: Negative for cough and shortness of breath  Cardiovascular: Negative for chest pain and palpitations  Gastrointestinal: Negative for abdominal pain, anorexia, diarrhea and nausea  Endocrine: Negative for cold intolerance and heat intolerance  Genitourinary: Negative for dysuria, flank pain and frequency  Musculoskeletal: Negative for arthralgias, back pain, gait problem and myalgias  Skin: Positive for rash  Negative for color change  Allergic/Immunologic: Negative for environmental allergies  Neurological: Negative for dizziness, numbness and headaches  Psychiatric/Behavioral: Negative for behavioral problems and sleep disturbance  The following portions of the patient's history were reviewed and updated as appropriate : past family history, past medical history, past social history and past surgical history        Current Outpatient Prescriptions:     diphenoxylate-atropine (LOMOTIL) 2 5-0 025 mg per tablet, Take 1 tablet by mouth 4 (four) times a day as needed, Disp: , Rfl:     esomeprazole (NexIUM) 40 MG capsule, Take 1 capsule by mouth 2 (two) times a day, Disp: , Rfl:     hydrocortisone (CORTIFOAM) 10 % rectal foam, Insert into the rectum, Disp: , Rfl:     levothyroxine 50 mcg tablet, TAKE 1 TABLET BY MOUTH DAILY AS DIRECTED, Disp: 90 tablet, Rfl: 0    lidocaine (LIDODERM) 5 %, Apply 1 patch topically, Disp: , Rfl:     ondansetron (ZOFRAN) 4 mg tablet, Take 1 tablet by mouth 3 (three) times a day, Disp: , Rfl:     PREVALITE 4 g packet, 1 PACKET TWICE A DAY, Disp: , Rfl: 3    SUMAtriptan (IMITREX) 100 mg tablet, Take 1 tablet (100 mg total) by mouth once as needed for migraine for up to 1 dose, Disp: 27 tablet, Rfl: 0    SUMAtriptan (IMITREX) 20 MG/ACT nasal spray, 1 spray into each nostril, Disp: , Rfl:     tobramycin (TOBREX) 0 3 % SOLN, Apply 1 drop to eye 4 (four) times a day, Disp: , Rfl:     zolpidem (AMBIEN) 10 mg tablet, Take 1 tablet (10 mg total) by mouth daily at bedtime as needed for sleep, Disp: 90 tablet, Rfl: 1    acyclovir (ZOVIRAX) 5 % ointment, Apply topically every 4 (four) hours as needed (Skin irritation), Disp: 15 g, Rfl: 0    benzonatate (TESSALON) 200 MG capsule, Take 1 capsule by mouth 3 (three) times a day as needed, Disp: , Rfl:     Butalbital-APAP-Caffeine -40 MG per capsule, Take 1 capsule by mouth every 6 (six) hours as needed, Disp: , Rfl:     carisoprodol (SOMA) 350 mg tablet, Take 1 tablet by mouth 3 (three) times a day as needed, Disp: , Rfl:     gabapentin (NEURONTIN) 100 mg capsule, Take 1 capsule (100 mg total) by mouth 2 (two) times a day, Disp: 60 capsule, Rfl: 0    levofloxacin (LEVAQUIN) 500 mg tablet, Take 1 tablet by mouth daily, Disp: , Rfl:     oxyCODONE-acetaminophen (PERCOCET) 5-325 mg per tablet, Take by mouth, Disp: , Rfl:     ramelteon (ROZEREM) 8 mg tablet, Take 1 tablet (8 mg total) by mouth daily at bedtime PRN, Disp: 30 tablet, Rfl: 0    tiZANidine (ZANAFLEX) 2 mg tablet, Take 1 tablet by mouth 2 (two) times a day, Disp: , Rfl:     valACYclovir (VALTREX) 1,000 mg tablet, Take 1 tablet (1,000 mg total) by mouth 3 (three) times a day for 7 days, Disp: 21 tablet, Rfl: 0    Objective:    Vitals:    08/05/18 1143   BP: 156/84   BP Location: Left arm   Patient Position: Sitting   Pulse: (!) 118   Temp: 97 9 °F (36 6 °C)   TempSrc: Tympanic   SpO2: 95%   Weight: 69 3 kg (152 lb 11 2 oz)        Physical Exam   Constitutional: Vital signs are normal  She appears well-developed and well-nourished  She is cooperative  She does not appear ill  No distress  She is sedated  HENT:   Head: Normocephalic and atraumatic  Right Ear: Hearing and external ear normal    Left Ear: Hearing and external ear normal    Nose: Nose normal  No nasal deformity or septal deviation  Mouth/Throat: Oropharynx is clear and moist and mucous membranes are normal    Eyes: EOM and lids are normal  Pupils are equal, round, and reactive to light  Neck: Trachea normal and normal range of motion  Neck supple  No edema and no erythema present  No thyromegaly present  Cardiovascular: Normal rate  Pulmonary/Chest: Effort normal  No respiratory distress  Abdominal: Normal appearance  There is no guarding  Musculoskeletal: Normal range of motion  Right shoulder: She exhibits no pain  Neurological: She is alert  She has normal strength  No cranial nerve deficit or sensory deficit  Skin: Skin is warm and dry  Psychiatric: She has a normal mood and affect   Her speech is normal and behavior is normal  Judgment and thought content normal  Cognition and memory are normal

## 2018-08-10 DIAGNOSIS — E03.9 HYPOTHYROIDISM, UNSPECIFIED TYPE: ICD-10-CM

## 2018-08-10 RX ORDER — LEVOTHYROXINE SODIUM 0.05 MG/1
50 TABLET ORAL DAILY
Qty: 90 TABLET | Refills: 1 | Status: SHIPPED | OUTPATIENT
Start: 2018-08-10 | End: 2019-01-24 | Stop reason: SDUPTHER

## 2018-08-21 DIAGNOSIS — G47.9 SLEEP DISORDER: ICD-10-CM

## 2018-08-21 RX ORDER — ZOLPIDEM TARTRATE 10 MG/1
10 TABLET ORAL
Qty: 90 TABLET | Refills: 1 | Status: SHIPPED | OUTPATIENT
Start: 2018-08-21 | End: 2019-03-18 | Stop reason: SDUPTHER

## 2018-09-01 DIAGNOSIS — R51.9 FACIAL PAIN: ICD-10-CM

## 2018-09-01 RX ORDER — GABAPENTIN 100 MG/1
CAPSULE ORAL
Qty: 60 CAPSULE | Refills: 0 | Status: SHIPPED | OUTPATIENT
Start: 2018-09-01 | End: 2018-09-30 | Stop reason: SDUPTHER

## 2018-09-30 DIAGNOSIS — R51.9 FACIAL PAIN: ICD-10-CM

## 2018-10-01 RX ORDER — GABAPENTIN 100 MG/1
CAPSULE ORAL
Qty: 60 CAPSULE | Refills: 0 | Status: SHIPPED | OUTPATIENT
Start: 2018-10-01 | End: 2018-10-25 | Stop reason: SDUPTHER

## 2018-10-25 DIAGNOSIS — R51.9 FACIAL PAIN: ICD-10-CM

## 2018-10-25 RX ORDER — GABAPENTIN 100 MG/1
100 CAPSULE ORAL 2 TIMES DAILY
Qty: 180 CAPSULE | Refills: 1 | Status: SHIPPED | OUTPATIENT
Start: 2018-10-25

## 2018-10-25 NOTE — TELEPHONE ENCOUNTER
Fax came thru [facility]    Gabapentin 100 mg capsule  Take 1 capsule by mouth twice a day  #180  [facility]

## 2018-12-27 ENCOUNTER — OFFICE VISIT (OUTPATIENT)
Dept: FAMILY MEDICINE CLINIC | Facility: CLINIC | Age: 68
End: 2018-12-27
Payer: MEDICARE

## 2018-12-27 VITALS
DIASTOLIC BLOOD PRESSURE: 98 MMHG | HEIGHT: 60 IN | SYSTOLIC BLOOD PRESSURE: 154 MMHG | WEIGHT: 154.19 LBS | HEART RATE: 88 BPM | OXYGEN SATURATION: 98 % | TEMPERATURE: 97.5 F | BODY MASS INDEX: 30.27 KG/M2 | RESPIRATION RATE: 17 BRPM

## 2018-12-27 DIAGNOSIS — J01.00 ACUTE NON-RECURRENT MAXILLARY SINUSITIS: Primary | ICD-10-CM

## 2018-12-27 DIAGNOSIS — G43.809 OTHER MIGRAINE WITHOUT STATUS MIGRAINOSUS, NOT INTRACTABLE: ICD-10-CM

## 2018-12-27 PROBLEM — G47.00 INSOMNIA: Status: ACTIVE | Noted: 2017-04-18

## 2018-12-27 PROBLEM — G43.909 MIGRAINE: Status: ACTIVE | Noted: 2018-12-27

## 2018-12-27 PROBLEM — K50.90 CROHN'S DISEASE (HCC): Status: ACTIVE | Noted: 2017-04-18

## 2018-12-27 PROCEDURE — 99213 OFFICE O/P EST LOW 20 MIN: CPT | Performed by: FAMILY MEDICINE

## 2018-12-27 RX ORDER — AMOXICILLIN 875 MG/1
875 TABLET, COATED ORAL 2 TIMES DAILY
Qty: 20 TABLET | Refills: 0 | Status: SHIPPED | OUTPATIENT
Start: 2018-12-27 | End: 2019-01-06

## 2018-12-27 RX ORDER — SUMATRIPTAN 100 MG/1
100 TABLET, FILM COATED ORAL ONCE AS NEEDED
Qty: 27 TABLET | Refills: 0 | Status: SHIPPED | OUTPATIENT
Start: 2018-12-27 | End: 2019-03-12 | Stop reason: SDUPTHER

## 2018-12-27 NOTE — PROGRESS NOTES
[facility]      NAME: Josesito Weber  AGE: 70 y o  SEX: female  : 1950   MRN: [id number]    DATE: 2018  TIME: 5:12 PM    Assessment and Plan     Problem List Items Addressed This Visit     Migraine    Relevant Medications    SUMAtriptan (IMITREX) 100 mg tablet      Other Visit Diagnoses     Acute non-recurrent maxillary sinusitis    -  Primary    rx given for amoxil 875 bid x 10 days  drink plenty of fluids  call further probs  Relevant Medications    amoxicillin (AMOXIL) 875 mg tablet              Return to office in: prn, schedule pe/awv near future  Chief Complaint     Chief Complaint   Patient presents with    Cold Like Symptoms     cough,congestion,chest congestion,sinus pressure and PND x couple days  Has taken Sudafed and Mucinex with little relief       History of Present Illness     2-3 day hx of chest congestion, cough, green mucus  No fevers  Taking mucinex and sudafed  The following portions of the patient's history were reviewed and updated as appropriate: allergies, current medications, past family history, past medical history, past social history, past surgical history and problem list     Review of Systems   Review of Systems   Constitutional: Negative for chills and fever  HENT: Positive for congestion and postnasal drip  Respiratory: Positive for cough  Negative for shortness of breath  Cardiovascular: Negative  Active Problem List     Patient Active Problem List   Diagnosis    Migraine    Insomnia    Crohn's disease ([address] )    Hyperlipidemia    Primary hypothyroidism       Objective   /98 (BP Location: Left arm, Patient Position: Sitting, Cuff Size: Adult)   Pulse 88   Temp 97 5 °F (36 4 °C) (Tympanic)   Resp 17   Ht 5' (1 524 m)   Wt 69 9 kg (154 lb 3 oz)   SpO2 98%   Breastfeeding? No   BMI 30 11 kg/m²     Physical Exam   Constitutional: She appears well-developed and well-nourished     HENT:   Turbinates inflamed   Neck: Normal range of motion  Neck supple     Pulmonary/Chest: Effort normal and breath sounds normal        Pertinent Laboratory/Diagnostic Studies:  none    Current Medications     Current Outpatient Prescriptions:     carisoprodol (SOMA) 350 mg tablet, Take 1 tablet by mouth 3 (three) times a day as needed, Disp: , Rfl:     diphenoxylate-atropine (LOMOTIL) 2 5-0 025 mg per tablet, Take 1 tablet by mouth 4 (four) times a day as needed, Disp: , Rfl:     esomeprazole (NexIUM) 40 MG capsule, Take 1 capsule by mouth 2 (two) times a day, Disp: , Rfl:     gabapentin (NEURONTIN) 100 mg capsule, Take 1 capsule (100 mg total) by mouth 2 (two) times a day, Disp: 180 capsule, Rfl: 1    levothyroxine 50 mcg tablet, Take 1 tablet (50 mcg total) by mouth daily, Disp: 90 tablet, Rfl: 1    ondansetron (ZOFRAN) 4 mg tablet, Take 1 tablet by mouth 3 (three) times a day, Disp: , Rfl:     PREVALITE 4 g packet, 1 PACKET TWICE A DAY, Disp: , Rfl: 3    SUMAtriptan (IMITREX) 100 mg tablet, Take 1 tablet (100 mg total) by mouth once as needed for migraine for up to 1 dose, Disp: 27 tablet, Rfl: 0    zolpidem (AMBIEN) 10 mg tablet, Take 1 tablet (10 mg total) by mouth daily at bedtime as needed for sleep, Disp: 90 tablet, Rfl: 1    amoxicillin (AMOXIL) 875 mg tablet, Take 1 tablet (875 mg total) by mouth 2 (two) times a day for 10 days, Disp: 20 tablet, Rfl: 0    Butalbital-APAP-Caffeine -40 MG per capsule, Take 1 capsule by mouth every 6 (six) hours as needed, Disp: , Rfl:     hydrocortisone (CORTIFOAM) 10 % rectal foam, Insert into the rectum, Disp: , Rfl:     lidocaine (LIDODERM) 5 %, Apply 1 patch topically, Disp: , Rfl:     oxyCODONE-acetaminophen (PERCOCET) 5-325 mg per tablet, Take by mouth, Disp: , Rfl:     SUMAtriptan (IMITREX) 20 MG/ACT nasal spray, 1 spray into each nostril, Disp: , Rfl:     tiZANidine (ZANAFLEX) 2 mg tablet, Take 1 tablet by mouth 2 (two) times a day, Disp: , Rfl:     tobramycin (TOBREX) 0 3 % SOLN, Apply 1 drop to eye 4 (four) times a day, Disp: , Rfl:     valACYclovir (VALTREX) 1,000 mg tablet, Take 1 tablet (1,000 mg total) by mouth 3 (three) times a day for 7 days, Disp: 21 tablet, Rfl: 0    Health Maintenance     Health Maintenance   Topic Date Due    Hepatitis C Screening  1950   Nneka Connell Medicare Annual Wellness Visit (AWV)  1950    CRC Screening: Colonoscopy  1950    DTaP,Tdap,and Td Vaccines (1 - Tdap) 10/18/1970    Fall Risk  10/18/2014    Urinary Incontinence Screening  10/18/2014    Pneumococcal PPSV23/PCV13 65+ Years / Low and Medium Risk (2 of 2 - PPSV23) 06/06/2017    INFLUENZA VACCINE  06/27/2019 (Originally 7/1/2018)    Depression Screening PHQ  08/05/2019     Immunization History   Administered Date(s) Administered    Pneumococcal Conjugate 13-Valent 06/06/2016    Zoster 11/26/2013       Pia Gonzales DO  [facility]

## 2019-01-24 ENCOUNTER — APPOINTMENT (OUTPATIENT)
Dept: RADIOLOGY | Facility: MEDICAL CENTER | Age: 69
End: 2019-01-24
Payer: MEDICARE

## 2019-01-24 ENCOUNTER — OFFICE VISIT (OUTPATIENT)
Dept: FAMILY MEDICINE CLINIC | Facility: CLINIC | Age: 69
End: 2019-01-24
Payer: MEDICARE

## 2019-01-24 VITALS
OXYGEN SATURATION: 97 % | WEIGHT: 156 LBS | BODY MASS INDEX: 30.63 KG/M2 | SYSTOLIC BLOOD PRESSURE: 162 MMHG | RESPIRATION RATE: 17 BRPM | HEIGHT: 60 IN | TEMPERATURE: 98.7 F | HEART RATE: 94 BPM | DIASTOLIC BLOOD PRESSURE: 100 MMHG

## 2019-01-24 DIAGNOSIS — E03.9 HYPOTHYROIDISM, UNSPECIFIED TYPE: ICD-10-CM

## 2019-01-24 DIAGNOSIS — M79.672 LEFT FOOT PAIN: ICD-10-CM

## 2019-01-24 DIAGNOSIS — E03.9 PRIMARY HYPOTHYROIDISM: ICD-10-CM

## 2019-01-24 DIAGNOSIS — I10 ESSENTIAL HYPERTENSION: Primary | ICD-10-CM

## 2019-01-24 PROCEDURE — 99214 OFFICE O/P EST MOD 30 MIN: CPT | Performed by: FAMILY MEDICINE

## 2019-01-24 PROCEDURE — 73630 X-RAY EXAM OF FOOT: CPT

## 2019-01-24 RX ORDER — LOSARTAN POTASSIUM 100 MG/1
100 TABLET ORAL DAILY
Qty: 30 TABLET | Refills: 1 | Status: SHIPPED | OUTPATIENT
Start: 2019-01-24 | End: 2019-02-18 | Stop reason: SDUPTHER

## 2019-01-24 RX ORDER — TRAMADOL HYDROCHLORIDE 50 MG/1
TABLET ORAL
Qty: 14 TABLET | Refills: 0 | Status: SHIPPED | OUTPATIENT
Start: 2019-01-24 | End: 2019-01-25

## 2019-01-24 RX ORDER — LEVOTHYROXINE SODIUM 0.05 MG/1
50 TABLET ORAL DAILY
Qty: 90 TABLET | Refills: 1 | Status: SHIPPED | OUTPATIENT
Start: 2019-01-24 | End: 2019-07-21 | Stop reason: SDUPTHER

## 2019-01-24 NOTE — ASSESSMENT & PLAN NOTE
Patient is status post fall at her [employer] on January 6th  She misjudged the last step and landed funny on her foot  Will sent for x-ray  Will call with results    Patient also has an appointment with her podiatrist, Dr Onelia Zacarias, next week

## 2019-01-24 NOTE — ASSESSMENT & PLAN NOTE
Suboptimal today  Will start losartan 100 mg daily    I would like to recheck her blood pressure in 3 4 weeks

## 2019-01-24 NOTE — PROGRESS NOTES
[facility]      NAME: Promise Lees  AGE: 70 y o  SEX: female  : 1950   MRN: [id number]    DATE: 2019  TIME: 10:51 AM    Assessment and Plan     Problem List Items Addressed This Visit     Primary hypothyroidism     Doing well on levothyroxine 50 mcg daily  Medication was refilled today         Relevant Medications    levothyroxine 50 mcg tablet    Essential hypertension - Primary     Suboptimal today  Will start losartan 100 mg daily  I would like to recheck her blood pressure in 3 4 weeks         Relevant Medications    losartan (COZAAR) 100 MG tablet    Left foot pain     Patient is status post fall at her [facility] on   She misjudged the last step and landed funny on her foot  Will sent for x-ray  Will call with results  Patient also has an appointment with her podiatrist, Dr Ros Sood, next week         Relevant Medications    traMADol (ULTRAM) 50 mg tablet    Other Relevant Orders    XR foot 3+ vw left      Other Visit Diagnoses     Hypothyroidism, unspecified type        Relevant Medications    levothyroxine 50 mcg tablet              Return to office in:  [facility] blood pressure in 3-4 weeks    Chief Complaint     Chief Complaint   Patient presents with    Foot Pain     L x1 5wk       History of Present Illness     Pt fell at [facility]   She misjudged a step  She landed funny on her L foot and her face hit a radiator  Denied any LOC  Face is much better, but still w/ some pain  But her L foot is still painful  The following portions of the patient's history were reviewed and updated as appropriate: allergies, current medications, past family history, past medical history, past social history, past surgical history and problem list     Review of Systems   Review of Systems   Respiratory: Negative  Cardiovascular: Negative  Gastrointestinal: Negative  Genitourinary: Negative  Musculoskeletal: Positive for arthralgias         Active Problem List     Patient Active Problem List   Diagnosis    Migraine    Insomnia    Cro[facility]'s disease ([facility] [address]    Hyperlipidemia    Primary hypothyroidism    Essential hypertension    Left foot pain       Objective   /100   Pulse 94   Temp 98 7 °F (37 1 °C) (Tympanic)   Resp 17   Ht 5' (1 524 m)   Wt 70 8 kg (156 lb)   SpO2 97%   BMI 30 47 kg/m²     Physical Exam    Pertinent Laboratory/Diagnostic Studies:  None    Current Medications     Current Outpatient Prescriptions:     Butalbital-APAP-Caffeine -40 MG per capsule, Take 1 capsule by mouth every 6 (six) hours as needed, Disp: , Rfl:     carisoprodol (SOMA) 350 mg tablet, Take 1 tablet by mouth 3 (three) times a day as needed, Disp: , Rfl:     diphenoxylate-atropine (LOMOTIL) 2 5-0 025 mg per tablet, Take 1 tablet by mouth 4 (four) times a day as needed, Disp: , Rfl:     esomeprazole (NexIUM) 40 MG capsule, Take 1 capsule by mouth 2 (two) times a day, Disp: , Rfl:     gabapentin (NEURONTIN) 100 mg capsule, Take 1 capsule (100 mg total) by mouth 2 (two) times a day, Disp: 180 capsule, Rfl: 1    hydrocortisone (CORTIFOAM) 10 % rectal foam, Insert into the rectum, Disp: , Rfl:     levothyroxine 50 mcg tablet, Take 1 tablet (50 mcg total) by mouth daily, Disp: 90 tablet, Rfl: 1    lidocaine (LIDODERM) 5 %, Apply 1 patch topically, Disp: , Rfl:     ondansetron (ZOFRAN) 4 mg tablet, Take 1 tablet by mouth 3 (three) times a day, Disp: , Rfl:     PREVALITE 4 g packet, 1 PACKET TWICE A DAY, Disp: , Rfl: 3    SUMAtriptan (IMITREX) 100 mg tablet, Take 1 tablet (100 mg total) by mouth once as needed for migraine for up to 1 dose, Disp: 27 tablet, Rfl: 0    tobramycin (TOBREX) 0 3 % SOLN, Apply 1 drop to eye 4 (four) times a day, Disp: , Rfl:     zolpidem (AMBIEN) 10 mg tablet, Take 1 tablet (10 mg total) by mouth daily at bedtime as needed for sleep, Disp: 90 tablet, Rfl: 1    losartan (COZAAR) 100 MG tablet, Take 1 tablet (100 mg total) by mouth daily, Disp: 30 tablet, Rfl: 1    SUMAtriptan (IMITREX) 20 MG/ACT nasal spray, 1 spray into each nostril, Disp: , Rfl:     tiZANidine (ZANAFLEX) 2 mg tablet, Take 1 tablet by mouth 2 (two) times a day, Disp: , Rfl:     traMADol (ULTRAM) 50 mg tablet, 1 tab bid prn, Disp: 14 tablet, Rfl: 0    valACYclovir (VALTREX) 1,000 mg tablet, Take 1 tablet (1,000 mg total) by mouth 3 (three) times a day for 7 days, Disp: 21 tablet, Rfl: 0    Health Maintenance     Health Maintenance   Topic Date Due    Hepatitis C Screening  1950    Medicare Annual Wellness Visit (AWV)  1950    DTaP,Tdap,and Td Vaccines (1 - Tdap) 10/18/1970    Fall Risk  10/18/2014    Urinary Incontinence Screening  10/18/2014    Pneumococcal PPSV23/PCV13 65+ Years / Low and Medium Risk (2 of 2 - PPSV23) 06/06/2017    INFLUENZA VACCINE  06/27/2019 (Originally 7/1/2018)    Depression Screening PHQ  08/05/2019    CRC Screening: Colonoscopy  07/28/2024     Immunization History   Administered Date(s) Administered    Pneumococcal Conjugate 13-Valent 06/06/2016    Zoster 11/26/2013       Kwan Judge DO  [facility]

## 2019-01-25 ENCOUNTER — TELEPHONE (OUTPATIENT)
Dept: FAMILY MEDICINE CLINIC | Facility: CLINIC | Age: 69
End: 2019-01-25

## 2019-01-25 DIAGNOSIS — M79.672 LEFT FOOT PAIN: Primary | ICD-10-CM

## 2019-01-25 RX ORDER — HYDROCODONE BITARTRATE AND ACETAMINOPHEN 5; 325 MG/1; MG/1
TABLET ORAL
Qty: 14 TABLET | Refills: 0 | Status: SHIPPED | OUTPATIENT
Start: 2019-01-25 | End: 2019-04-11

## 2019-01-25 NOTE — TELEPHONE ENCOUNTER
Patients  called stating his wife is in a lot of pain from her 2 broken toes and would like you to prescribe her something for the pain    Please send to [facility]

## 2019-02-18 DIAGNOSIS — I10 ESSENTIAL HYPERTENSION: ICD-10-CM

## 2019-02-18 RX ORDER — LOSARTAN POTASSIUM 100 MG/1
100 TABLET ORAL DAILY
Qty: 90 TABLET | Refills: 1 | Status: SHIPPED | OUTPATIENT
Start: 2019-02-18 | End: 2019-06-10 | Stop reason: SDUPTHER

## 2019-03-12 DIAGNOSIS — G43.809 OTHER MIGRAINE WITHOUT STATUS MIGRAINOSUS, NOT INTRACTABLE: ICD-10-CM

## 2019-03-12 RX ORDER — SUMATRIPTAN 100 MG/1
100 TABLET, FILM COATED ORAL ONCE AS NEEDED
Qty: 15 TABLET | Refills: 0 | Status: SHIPPED | OUTPATIENT
Start: 2019-03-12 | End: 2019-06-06 | Stop reason: SDUPTHER

## 2019-03-18 DIAGNOSIS — G47.9 SLEEP DISORDER: ICD-10-CM

## 2019-03-18 RX ORDER — ZOLPIDEM TARTRATE 10 MG/1
10 TABLET ORAL
Qty: 90 TABLET | Refills: 1 | Status: SHIPPED | OUTPATIENT
Start: 2019-03-18 | End: 2019-06-10 | Stop reason: SDUPTHER

## 2019-05-31 DIAGNOSIS — G43.809 OTHER MIGRAINE WITHOUT STATUS MIGRAINOSUS, NOT INTRACTABLE: ICD-10-CM

## 2019-06-06 DIAGNOSIS — G43.809 OTHER MIGRAINE WITHOUT STATUS MIGRAINOSUS, NOT INTRACTABLE: ICD-10-CM

## 2019-06-06 RX ORDER — SUMATRIPTAN 100 MG/1
100 TABLET, FILM COATED ORAL ONCE AS NEEDED
Qty: 9 TABLET | Refills: 0 | Status: SHIPPED | OUTPATIENT
Start: 2019-06-06

## 2019-06-10 DIAGNOSIS — I10 ESSENTIAL HYPERTENSION: ICD-10-CM

## 2019-06-10 DIAGNOSIS — G47.9 SLEEP DISORDER: ICD-10-CM

## 2019-06-10 RX ORDER — ZOLPIDEM TARTRATE 10 MG/1
10 TABLET ORAL
Qty: 90 TABLET | Refills: 1 | Status: SHIPPED | OUTPATIENT
Start: 2019-06-10 | End: 2019-12-09 | Stop reason: SDUPTHER

## 2019-06-10 RX ORDER — LOSARTAN POTASSIUM 100 MG/1
100 TABLET ORAL DAILY
Qty: 90 TABLET | Refills: 1 | Status: SHIPPED | OUTPATIENT
Start: 2019-06-10 | End: 2020-02-24

## 2019-07-21 DIAGNOSIS — E03.9 HYPOTHYROIDISM, UNSPECIFIED TYPE: ICD-10-CM

## 2019-07-22 RX ORDER — LEVOTHYROXINE SODIUM 0.05 MG/1
TABLET ORAL
Qty: 90 TABLET | Refills: 1 | Status: SHIPPED | OUTPATIENT
Start: 2019-07-22 | End: 2020-01-22

## 2019-08-13 DIAGNOSIS — G47.9 SLEEP DISORDER: ICD-10-CM

## 2019-08-13 RX ORDER — ZOLPIDEM TARTRATE 10 MG/1
10 TABLET ORAL
Qty: 90 TABLET | Refills: 0 | OUTPATIENT
Start: 2019-08-13

## 2019-08-13 NOTE — TELEPHONE ENCOUNTER
Call pt  Refill request is too early for zolpidem  Last refilled on 6/16 for 90 days   She is not due for refill until 9/14 at earliest

## 2019-08-13 NOTE — TELEPHONE ENCOUNTER
Patient is requesting 90 day supply for Ambien 10mg  Per PDMP last filled 6/16/19 for 90 days       I made an appt for her to come in 9/25/19 for AWV

## 2019-08-14 NOTE — TELEPHONE ENCOUNTER
LMOM letting her know it was too early to refill Ambien, and she should call back in September when it gets closer to the end of her presciption

## 2019-08-26 ENCOUNTER — TELEPHONE (OUTPATIENT)
Dept: FAMILY MEDICINE CLINIC | Facility: CLINIC | Age: 69
End: 2019-08-26

## 2019-08-26 NOTE — TELEPHONE ENCOUNTER
Pt is leaving for vacation 9/6/19 till 9/20/19 wanted to see if we could authorize early refill for Zolpidem  PDM checked rx filled last 9/16/19 Q-90 via 90 day supply R-1  Please advise

## 2019-08-30 DIAGNOSIS — G43.809 OTHER MIGRAINE WITHOUT STATUS MIGRAINOSUS, NOT INTRACTABLE: ICD-10-CM

## 2019-08-30 RX ORDER — SUMATRIPTAN 100 MG/1
100 TABLET, FILM COATED ORAL ONCE AS NEEDED
Qty: 27 TABLET | Refills: 0 | Status: SHIPPED | OUTPATIENT
Start: 2019-08-30 | End: 2019-09-25

## 2019-09-24 PROBLEM — R73.9 ELEVATED BLOOD SUGAR: Status: ACTIVE | Noted: 2019-09-24

## 2019-09-25 ENCOUNTER — OFFICE VISIT (OUTPATIENT)
Dept: FAMILY MEDICINE CLINIC | Facility: CLINIC | Age: 69
End: 2019-09-25
Payer: MEDICARE

## 2019-09-25 VITALS
WEIGHT: 154 LBS | BODY MASS INDEX: 31.04 KG/M2 | HEART RATE: 99 BPM | SYSTOLIC BLOOD PRESSURE: 124 MMHG | RESPIRATION RATE: 16 BRPM | HEIGHT: 59 IN | TEMPERATURE: 98 F | OXYGEN SATURATION: 97 % | DIASTOLIC BLOOD PRESSURE: 82 MMHG

## 2019-09-25 DIAGNOSIS — E03.9 PRIMARY HYPOTHYROIDISM: ICD-10-CM

## 2019-09-25 DIAGNOSIS — R73.9 ELEVATED BLOOD SUGAR: ICD-10-CM

## 2019-09-25 DIAGNOSIS — Z13.220 SCREENING FOR CHOLESTEROL LEVEL: ICD-10-CM

## 2019-09-25 DIAGNOSIS — G47.00 INSOMNIA, UNSPECIFIED TYPE: ICD-10-CM

## 2019-09-25 DIAGNOSIS — I10 ESSENTIAL HYPERTENSION: ICD-10-CM

## 2019-09-25 DIAGNOSIS — K21.9 GASTROESOPHAGEAL REFLUX DISEASE WITHOUT ESOPHAGITIS: ICD-10-CM

## 2019-09-25 DIAGNOSIS — Z00.00 ENCOUNTER FOR MEDICARE ANNUAL WELLNESS EXAM: Primary | ICD-10-CM

## 2019-09-25 DIAGNOSIS — G43.809 OTHER MIGRAINE WITHOUT STATUS MIGRAINOSUS, NOT INTRACTABLE: ICD-10-CM

## 2019-09-25 DIAGNOSIS — B02.29 POST HERPETIC NEURALGIA: ICD-10-CM

## 2019-09-25 DIAGNOSIS — K50.919 CROHN'S DISEASE WITH COMPLICATION, UNSPECIFIED GASTROINTESTINAL TRACT LOCATION (HCC): ICD-10-CM

## 2019-09-25 PROCEDURE — 99214 OFFICE O/P EST MOD 30 MIN: CPT | Performed by: FAMILY MEDICINE

## 2019-09-25 PROCEDURE — G0439 PPPS, SUBSEQ VISIT: HCPCS | Performed by: FAMILY MEDICINE

## 2019-09-25 NOTE — ASSESSMENT & PLAN NOTE
History of elevated blood sugar  Will recheck labs in near future  Continue reduced carb diet exercise

## 2019-09-25 NOTE — PROGRESS NOTES
Assessment and Plan:    subsequent Medicare wellness visit today  Patient has a living will and healthcare power of   Depression screen, fall risk, urinary incontinence screens were negative  Patient refuses flu shot today  Patient had Prevnar 13  Discussed Pneumovax 23 today  Rx given for Shingrix  Patient refuses mammogram   Current with colonoscopy and DEXA      Problem List Items Addressed This Visit     Primary hypothyroidism    Essential hypertension    Elevated blood sugar      Other Visit Diagnoses     Encounter for Medicare annual wellness exam    -  Primary           Preventive health issues were discussed with patient, and age appropriate screening tests were ordered as noted in patient's After Visit Summary  Personalized health advice and appropriate referrals for health education or preventive services given if needed, as noted in patient's After Visit Summary  History of Present Illness:     Patient presents for Welcome to Medicare visit  Patient Care Team:  Miguel Grimaldo DO as PCP - General     Review of Systems:     Review of Systems   Problem List:     Patient Active Problem List   Diagnosis    Migraine    Insomnia    Cro[facility]'s disease ([facility] [address]    Hyperlipidemia    Primary hypothyroidism    Essential hypertension    Left foot pain    Elevated blood sugar      Past Medical and Surgical History:     History reviewed  No pertinent past medical history    Past Surgical History:   Procedure Laterality Date    BREAST SURGERY      Reduction    FOOT SURGERY      GALLBLADDER SURGERY      HYSTERECTOMY        Family History:     Family History   Problem Relation Age of Onset    No Known Problems Mother     No Known Problems Father     Hyperlipidemia Sister     Hyperlipidemia Brother     Alcohol abuse Neg Hx     Substance Abuse Neg Hx     Mental illness Neg Hx     Depression Neg Hx       Social History:     Social History     Socioeconomic History    Marital status: /Civil [employer]     Spouse name: None    Number of children: None    Years of education: None    Highest education level: None   Occupational History    None   Social Needs    Financial resource strain: None    Food insecurity:     Worry: None     Inability: None    Transportation needs:     Medical: None     Non-medical: None   Tobacco Use    Smoking status: Never Smoker    Smokeless tobacco: Never Used   Substance and Sexual Activity    Alcohol use: Yes     Comment: rare    Drug use: No    Sexual activity: None   Lifestyle    Physical activity:     Days per week: None     Minutes per session: None    Stress: None   Relationships    Social connections:     Talks on phone: None     Gets together: None     Attends Congregational service: None     Active member of club or organization: None     Attends meetings of clubs or organizations: None     Relationship status: None    Intimate partner violence:     Fear of current or ex partner: None     Emotionally abused: None     Physically abused: None     Forced sexual activity: None   Other Topics Concern    None   Social History Narrative    None      Medications and Allergies:     Current Outpatient Medications   Medication Sig Dispense Refill    esomeprazole (NexIUM) 40 MG capsule Take 1 capsule by mouth 2 (two) times a day      gabapentin (NEURONTIN) 100 mg capsule Take 1 capsule (100 mg total) by mouth 2 (two) times a day 180 capsule 1    hydrocortisone (CORTIFOAM) 10 % rectal foam Insert into the rectum      levothyroxine 50 mcg tablet TAKE 1 TABLET BY MOUTH EVERY DAY 90 tablet 1    losartan (COZAAR) 100 MG tablet Take 1 tablet (100 mg total) by mouth daily 90 tablet 1    ondansetron (ZOFRAN) 4 mg tablet Take 1 tablet by mouth 3 (three) times a day      PREVALITE 4 g packet 1 PACKET TWICE A DAY  3    SUMAtriptan (IMITREX) 100 mg tablet Take 1 tablet (100 mg total) by mouth once as needed for migraine for up to 1 dose 9 tablet 0    zolpidem (AMBIEN) 10 mg tablet Take 1 tablet (10 mg total) by mouth daily at bedtime as needed for sleep 90 tablet 1    lidocaine (LIDODERM) 5 % Apply 1 patch topically      SUMAtriptan (IMITREX) 100 mg tablet TAKE 1 TABLET (100 MG TOTAL) BY MOUTH ONCE AS NEEDED FOR MIGRAINE FOR UP TO 1 DOSE (Patient not taking: Reported on 9/25/2019) 27 tablet 0    valACYclovir (VALTREX) 1,000 mg tablet Take 1 tablet (1,000 mg total) by mouth 3 (three) times a day for 7 days 21 tablet 0     No current facility-administered medications for this visit  Allergies   Allergen Reactions    Latex Rash    Monascus Purpureus Went Yeast     Oseltamivir Vomiting    Sulfa Antibiotics     Sulfasalazine     Tetracycline       Immunizations:     Immunization History   Administered Date(s) Administered    INFLUENZA 09/16/2013    Pneumococcal Conjugate 13-Valent 06/06/2016    Zoster 11/26/2013      Health Maintenance:         Topic Date Due    Hepatitis C Screening  1950    MAMMOGRAM  09/25/2020 (Originally 2/12/2019)    CRC Screening: Colonoscopy  02/04/2022         Topic Date Due    Pneumococcal Vaccine: 65+ Years (2 of 2 - PPSV23) 06/06/2017      Medicare Screening Tests and Risk Assessments:     Osbaldo Mercado is here for her Subsequent Wellness visit  Last Medicare Wellness visit information reviewed, patient interviewed and updates made to the record today  Health Risk Assessment:   Patient rates overall health as good  Patient feels that their physical health rating is same  Eyesight was rated as same  Hearing was rated as same  Patient feels that their emotional and mental health rating is same  Pain experienced in the last 7 days has been none  Patient states that she has experienced no weight loss or gain in last 6 months  Depression Screening:   PHQ-2 Score: 0      Fall Risk Screening:    In the past year, patient has experienced: no history of falling in past year      Urinary Incontinence Screening:   Patient has not leaked urine accidently in the last six months  Home Safety:  Patient does not have trouble with stairs inside or outside of their home  Patient has working smoke alarms and has working carbon monoxide detector  Home safety hazards include: none  Nutrition:   Current diet is Regular  Medications:   Patient is currently taking over-the-counter supplements  OTC medications include: see medication list  Patient is able to manage medications  Activities of Daily Living (ADLs)/Instrumental Activities of Daily Living (IADLs):   Walk and transfer into and out of bed and chair?: Yes  Dress and groom yourself?: Yes    Bathe or shower yourself?: Yes    Feed yourself? Yes  Do your laundry/housekeeping?: Yes  Manage your money, pay your bills and track your expenses?: Yes  Make your own meals?: Yes    Do your own shopping?: Yes    Previous Hospitalizations:   Any hospitalizations or ED visits within the last 12 months?: No      Advance Care Planning:   Living will: Yes    Durable POA for healthcare:  Yes    Advanced directive: Yes    Advanced directive counseling given: Yes    Five wishes given: Yes    Patient declined ACP directive: Yes    End of Life Decisions reviewed with patient: Yes    Provider agrees with end of life decisions: Yes      Cognitive Screening:   Provider or family/friend/caregiver concerned regarding cognition?: No    PREVENTIVE SCREENINGS      Cardiovascular Screening:    General: Screening Not Indicated and History Lipid Disorder      Colorectal Cancer Screening:     General: Screening Current      Breast Cancer Screening:     General: Screening Current      Cervical Cancer Screening:    General: Screening Not Indicated      Osteoporosis Screening:    General: Risks and Benefits Discussed      Abdominal Aortic Aneurysm (AAA) Screening:        General: Risks and Benefits Discussed      Lung Cancer Screening:     General: Screening Not Indicated      Hepatitis C Screening:    General: Risks and Benefits Discussed    No exam data present     Physical Exam:     /80 (BP Location: Left arm, Patient Position: Sitting, Cuff Size: Adult)   Pulse 99   Temp 98 °F (36 7 °C) (Tympanic)   Resp 16   Ht 4' 11 06" (1 5 m)   Wt 69 9 kg (154 lb)   SpO2 97%   BMI 31 05 kg/m²     Physical Exam    Kacy Zacarias DO

## 2019-09-25 NOTE — ASSESSMENT & PLAN NOTE
Stable Crohn's disease    Recent colonoscopy was normal   Continue regular follow-up with GI as directed

## 2019-09-25 NOTE — PROGRESS NOTES
[facility]      NAME: Theresa Moon  AGE: 70 y o  SEX: female  : 1950   MRN: [id number]    DATE: 2019  TIME: 9:38 AM    Assessment and Plan     Problem List Items Addressed This Visit     Migraine      Stable migraines  Doing well on a occasional use of sumatriptan 100 and Zofran         Insomnia      Doing well on zolpidem 10 mg nightly without side effects or falls         Crohn's disease ([facility] [address] )      Stable Crohn's disease  Recent colonoscopy was normal   Continue regular follow-up with GI as directed         Primary hypothyroidism      Doing well on levothyroxine 50 mcg daily  Will check thyroid near future         Relevant Orders    TSH, 3rd generation with Free T4 reflex    Essential hypertension       Well controlled since starting losartan 100 mg daily         Relevant Orders    CBC and differential    TSH, 3rd generation with Free T4 reflex    Elevated blood sugar      History of elevated blood sugar  Will recheck labs in near future  Continue reduced carb diet exercise  Relevant Orders    Comprehensive metabolic panel    Hemoglobin A1C    Post herpetic neuralgia      Longstanding history of post herpetic neuralgia left-side far head parietal region  Patient does well with p r n  Use of low-dose gabapentin         Gastroesophageal reflux disease without esophagitis      Doing well on Nexium 40 mg b i d   This is being managed by her gastroenterologist           Other Visit Diagnoses     Encounter for Medicare annual wellness exam    -  Primary    Screening for cholesterol level        Relevant Orders    Lipid Panel with Direct LDL reflex              Return to office in: rx given for yearly FBW at [address] [place] [address]   Will call    6 mos med check    Refuses mammogram  Current w/ colonoscopy and DEXA    Chief Complaint     Chief Complaint   Patient presents with    Medicare Wellness Visit       History of Present Illness      Patient presents for recheck of chronic medical problems today  Overall she is feeling well  Doing well on levothyroxine for thyroid  Doing well on losartan for hypertension  Doing well on zolpidem for insomnia  No side effects on current meds      The following portions of the patient's history were reviewed and updated as appropriate: allergies, current medications, past family history, past medical history, past social history, past surgical history and problem list     Review of Systems   Review of Systems   Respiratory: Negative  Cardiovascular: Negative  Gastrointestinal: Negative  Genitourinary: Negative  Active Problem List     Patient Active Problem List   Diagnosis    Migraine    Insomnia    Cro[facility]'s disease ([facility] [address]    Hyperlipidemia    Primary hypothyroidism    Essential hypertension    Left foot pain    Elevated blood sugar    Post herpetic neuralgia    Gastroesophageal reflux disease without esophagitis       Objective   /82   Pulse 99   Temp 98 °F (36 7 °C) (Tympanic)   Resp 16   Ht 4' 11 06" (1 5 m)   Wt 69 9 kg (154 lb)   SpO2 97%   BMI 31 05 kg/m²     Physical Exam   Cardiovascular: Normal rate, regular rhythm, normal heart sounds and intact distal pulses  Carotids: no bruits  Ext: no edema   Pulmonary/Chest: Effort normal  No respiratory distress  She has no wheezes  She has no rales  Psychiatric: She has a normal mood and affect   Her behavior is normal  Thought content normal        Pertinent Laboratory/Diagnostic Studies:   none    Current Medications     Current Outpatient Medications:     esomeprazole (NexIUM) 40 MG capsule, Take 1 capsule by mouth 2 (two) times a day, Disp: , Rfl:     gabapentin (NEURONTIN) 100 mg capsule, Take 1 capsule (100 mg total) by mouth 2 (two) times a day, Disp: 180 capsule, Rfl: 1    hydrocortisone (CORTIFOAM) 10 % rectal foam, Insert into the rectum, Disp: , Rfl:     levothyroxine 50 mcg tablet, TAKE 1 TABLET BY MOUTH EVERY DAY, Disp: 90 tablet, Rfl: 1    losartan (COZAAR) 100 MG tablet, Take 1 tablet (100 mg total) by mouth daily, Disp: 90 tablet, Rfl: 1    ondansetron (ZOFRAN) 4 mg tablet, Take 1 tablet by mouth 3 (three) times a day, Disp: , Rfl:     PREVALITE 4 g packet, 1 PACKET TWICE A DAY, Disp: , Rfl: 3    SUMAtriptan (IMITREX) 100 mg tablet, Take 1 tablet (100 mg total) by mouth once as needed for migraine for up to 1 dose, Disp: 9 tablet, Rfl: 0    zolpidem (AMBIEN) 10 mg tablet, Take 1 tablet (10 mg total) by mouth daily at bedtime as needed for sleep, Disp: 90 tablet, Rfl: 1    lidocaine (LIDODERM) 5 %, Apply 1 patch topically, Disp: , Rfl:     valACYclovir (VALTREX) 1,000 mg tablet, Take 1 tablet (1,000 mg total) by mouth 3 (three) times a day for 7 days, Disp: 21 tablet, Rfl: 0    Health Maintenance     Health Maintenance   Topic Date Due    Hepatitis C Screening  1950    Medicare Annual Wellness Visit (AWV)  1950    BMI: Followup Plan  10/18/1967    Pneumococcal Vaccine: 65+ Years (2 of 2 - PPSV23) 06/06/2017    INFLUENZA VACCINE  10/22/2019 (Originally 7/1/2019)    DTaP,Tdap,and Td Vaccines (1 - Tdap) 09/25/2020 (Originally 10/18/1970)    HEPATITIS B VACCINES (1 of 3 - Risk 3-dose series) 09/25/2020 (Originally 10/18/1968)    MAMMOGRAM  09/25/2020 (Originally 2/12/2019)    Fall Risk  09/25/2020    Depression Screening PHQ  09/25/2020    Urinary Incontinence Screening  09/25/2020    BMI: Adult  09/25/2020    CRC Screening: Colonoscopy  02/04/2022    Pneumococcal Vaccine: Pediatrics (0 to 5 Years) and At-Risk Patients (6 to 59 Years)  Aged [employer] History   Administered Date(s) Administered    INFLUENZA 09/16/2013    Pneumococcal Conjugate 13-Valent 06/06/2016    Zoster 11/26/2013       Deb Baez DO  [facility]

## 2019-09-25 NOTE — ASSESSMENT & PLAN NOTE
Longstanding history of post herpetic neuralgia left-side far head parietal region  Patient does well with p r n   Use of low-dose gabapentin

## 2019-09-25 NOTE — PATIENT INSTRUCTIONS
Medicare Preventive Visit Patient Instructions  Thank you for completing your Welcome to Medicare Visit or Medicare Annual Wellness Visit today  Your next wellness visit will be due in one year (9/25/2020)  The screening/preventive services that you may require over the next 5-10 years are detailed below  Some tests may not apply to you based off risk factors and/or age  Screening tests ordered at today's visit but not completed yet may show as past due  Also, please note that scanned in results may not display below  Preventive Screenings:  Service Recommendations Previous Testing/Comments   Colorectal Cancer Screening  * Colonoscopy    * Fecal Occult Blood Test (FOBT)/Fecal Immunochemical Test (FIT)  * Fecal DNA/Cologuard Test  * Flexible Sigmoidoscopy Age: 54-65 years old   Colonoscopy: every 10 years (may be performed more frequently if at higher risk)  OR  FOBT/FIT: every 1 year  OR  Cologuard: every 3 years  OR  Sigmoidoscopy: every 5 years  Screening may be recommended earlier than age 48 if at higher risk for colorectal cancer  Also, an individualized decision between you and your healthcare provider will decide whether screening between the ages of 74-80 would be appropriate  Colonoscopy: 02/04/2019  FOBT/FIT: Not on file  Cologuard: Not on file  Sigmoidoscopy: Not on file    Screening Current     Breast Cancer Screening Age: 36 years old  Frequency: every 1-2 years  Not required if history of left and right mastectomy Mammogram: 02/12/2018    Screening Current   Cervical Cancer Screening Between the ages of 21-29, pap smear recommended once every 3 years  Between the ages of 33-67, can perform pap smear with HPV co-testing every 5 years     Recommendations may differ for women with a history of total hysterectomy, cervical cancer, or abnormal pap smears in past  Pap Smear: Not on file    Screening Not Indicated   Hepatitis C Screening Once for adults born between 1945 and 1965  More frequently in patients at high risk for Hepatitis C Hep C Antibody: Not on file       Diabetes Screening 1-2 times per year if you're at risk for diabetes or have pre-diabetes Fasting glucose: No results in last 5 years   A1C: 6 8       Cholesterol Screening Once every 5 years if you don't have a lipid disorder  May order more often based on risk factors  Lipid panel: 03/03/2017    Screening Not Indicated  History Lipid Disorder     Other Preventive Screenings Covered by Medicare:  1  Abdominal Aortic Aneurysm (AAA) Screening: covered once if your at risk  You're considered to be at risk if you have a family history of AAA  2  Lung Cancer Screening: covers low dose CT scan once per year if you meet all of the following conditions: (1) Age 50-69; (2) No signs or symptoms of lung cancer; (3) Current smoker or have quit smoking within the last 15 years; (4) You have a tobacco smoking history of at least 30 pack years (packs per day multiplied by number of years you smoked); (5) You get a written order from a healthcare provider  3  Glaucoma Screening: covered annually if you're considered high risk: (1) You have diabetes OR (2) Family history of glaucoma OR (3)  aged 48 and older OR (3)  American aged 72 and older  3  Osteoporosis Screening: covered every 2 years if you meet one of the following conditions: (1) You're estrogen deficient and at risk for osteoporosis based off medical history and other findings; (2) Have a vertebral abnormality; (3) On glucocorticoid therapy for more than 3 months; (4) Have primary hyperparathyroidism; (5) On osteoporosis medications and need to assess response to drug therapy  · Last bone density test (DXA Scan): 02/12/2018  5  HIV Screening: covered annually if you're between the age of 12-76  Also covered annually if you are younger than 13 and older than 72 with risk factors for HIV infection   For pregnant patients, it is covered up to 3 times per pregnancy  Immunizations:  Immunization Recommendations   Influenza Vaccine Annual influenza vaccination during flu season is recommended for all persons aged >= 6 months who do not have contraindications   Pneumococcal Vaccine (Prevnar and Pneumovax)  * Prevnar = PCV13  * Pneumovax = PPSV23   Adults 25-60 years old: 1-3 doses may be recommended based on certain risk factors  Adults 72 years old: Prevnar (PCV13) vaccine recommended followed by Pneumovax (PPSV23) vaccine  If already received PPSV23 since turning 65, then PCV13 recommended at least one year after PPSV23 dose  Hepatitis B Vaccine 3 dose series if at intermediate or high risk (ex: diabetes, end stage renal disease, liver disease)   Tetanus (Td) Vaccine - COST NOT COVERED BY MEDICARE PART B Following completion of primary series, a booster dose should be given every 10 years to maintain immunity against tetanus  Td may also be given as tetanus wound prophylaxis  Tdap Vaccine - COST NOT COVERED BY MEDICARE PART B Recommended at least once for all adults  For pregnant patients, recommended with each pregnancy  Shingles Vaccine (Shingrix) - COST NOT COVERED BY MEDICARE PART B  2 shot series recommended in those aged 48 and above     Health Maintenance Due:      Topic Date Due    Hepatitis C Screening  1950    MAMMOGRAM  09/25/2020 (Originally 2/12/2019)    CRC Screening: Colonoscopy  02/04/2022     Immunizations Due:      Topic Date Due    Pneumococcal Vaccine: 65+ Years (2 of 2 - PPSV23) 06/06/2017     Advance Directives   What are advance directives? Advance directives are legal documents that state your wishes and plans for medical care  These plans are made ahead of time in case you lose your ability to make decisions for yourself  Advance directives can apply to any medical decision, such as the treatments you want, and if you want to donate organs  What are the types of advance directives?   There are many types of advance directives, and each state has rules about how to use them  You may choose a combination of any of the following:  · Living will: This is a written record of the treatment you want  You can also choose which treatments you do not want, which to limit, and which to stop at a certain time  This includes surgery, medicine, IV fluid, and tube feedings  · Durable power of  for healthcare [facility]): This is a written record that states who you want to make healthcare choices for you when you are unable to make them for yourself  This person, called a proxy, is usually a family member or a friend  You may choose more than 1 proxy  · Do not resuscitate (DNR) order:  A DNR order is used in case your heart stops beating or you stop breathing  It is a request not to have certain forms of treatment, such as CPR  A DNR order may be included in other types of advance directives  · Medical directive: This covers the care that you want if you are in a coma, near death, or unable to make decisions for yourself  You can list the treatments you want for each condition  Treatment may include pain medicine, surgery, blood transfusions, dialysis, IV or tube feedings, and a ventilator (breathing machine)  · Values history: This document has questions about your views, beliefs, and how you feel and think about life  This information can help others choose the care that you would choose  Why are advance directives important? An advance directive helps you control your care  Although spoken wishes may be used, it is better to have your wishes written down  Spoken wishes can be misunderstood, or not followed  Treatments may be given even if you do not want them  An advance directive may make it easier for your family to make difficult choices about your care     Weight Management   Why it is important to manage your weight:  Being overweight increases your risk of health conditions such as heart disease, high blood pressure, type 2 diabetes, and certain types of cancer  It can also increase your risk for osteoarthritis, sleep apnea, and other respiratory problems  Aim for a slow, steady weight loss  Even a small amount of weight loss can lower your risk of health problems  How to lose weight safely:  A safe and healthy way to lose weight is to eat fewer calories and get regular exercise  You can lose up about 1 pound a week by decreasing the number of calories you eat by 500 calories each day  Healthy meal plan for weight management:  A healthy meal plan includes a variety of foods, contains fewer calories, and helps you stay healthy  A healthy meal plan includes the following:  · Eat whole-grain foods more often  A healthy meal plan should contain fiber  Fiber is the part of grains, fruits, and vegetables that is not broken down by your body  Whole-grain foods are healthy and provide extra fiber in your diet  Some examples of whole-grain foods are whole-wheat breads and pastas, oatmeal, brown rice, and bulgur  · Eat a variety of vegetables every day  Include dark, leafy greens such as spinach, kale, mahnaz greens, and mustard greens  Eat yellow and orange vegetables such as carrots, sweet potatoes, and winter squash  · Eat a variety of fruits every day  Choose fresh or canned fruit (canned in its own juice or light syrup) instead of juice  Fruit juice has very little or no fiber  · Eat low-fat dairy foods  Drink fat-free (skim) milk or 1% milk  Eat fat-free yogurt and low-fat cottage cheese  Try low-fat cheeses such as mozzarella and other reduced-fat cheeses  · Choose meat and other protein foods that are low in fat  Choose beans or other legumes such as split peas or lentils  Choose fish, skinless poultry (chicken or turkey), or lean cuts of red meat (beef or pork)  Before you cook meat or poultry, cut off any visible fat  · Use less fat and oil  Try baking foods instead of frying them   Add less fat, such as margarine, sour cream, regular salad dressing and mayonnaise to foods  Eat fewer high-fat foods  Some examples of high-fat foods include french fries, doughnuts, ice cream, and cakes  · Eat fewer sweets  Limit foods and drinks that are high in sugar  This includes candy, cookies, regular soda, and sweetened drinks  Exercise:  Exercise at least 30 minutes per day on most days of the week  Some examples of exercise include walking, biking, dancing, and swimming  You can also fit in more physical activity by taking the stairs instead of the elevator or parking farther away from stores  Ask your healthcare provider about the best exercise plan for you  © Copyright [employer] 2018 Information is for End User's use only and may not be sold, redistributed or otherwise used for commercial purposes   All illustrations and images included in CareNotes® are the copyrighted property of [employer]  or [address] [place] [address]

## 2019-10-28 ENCOUNTER — APPOINTMENT (OUTPATIENT)
Dept: LAB | Facility: CLINIC | Age: 69
End: 2019-10-28
Payer: MEDICARE

## 2019-10-28 ENCOUNTER — TELEPHONE (OUTPATIENT)
Dept: FAMILY MEDICINE CLINIC | Facility: CLINIC | Age: 69
End: 2019-10-28

## 2019-10-28 DIAGNOSIS — R73.9 ELEVATED BLOOD SUGAR: ICD-10-CM

## 2019-10-28 DIAGNOSIS — Z13.220 SCREENING FOR CHOLESTEROL LEVEL: ICD-10-CM

## 2019-10-28 DIAGNOSIS — E78.2 MIXED HYPERLIPIDEMIA: Primary | ICD-10-CM

## 2019-10-28 DIAGNOSIS — E03.9 PRIMARY HYPOTHYROIDISM: ICD-10-CM

## 2019-10-28 DIAGNOSIS — I10 ESSENTIAL HYPERTENSION: ICD-10-CM

## 2019-10-28 LAB
ALBUMIN SERPL BCP-MCNC: 3.6 G/DL (ref 3.5–5)
ALP SERPL-CCNC: 100 U/L (ref 46–116)
ALT SERPL W P-5'-P-CCNC: 50 U/L (ref 12–78)
ANION GAP SERPL CALCULATED.3IONS-SCNC: 8 MMOL/L (ref 4–13)
AST SERPL W P-5'-P-CCNC: 34 U/L (ref 5–45)
BASOPHILS # BLD AUTO: 0.08 THOUSANDS/ΜL (ref 0–0.1)
BASOPHILS NFR BLD AUTO: 1 % (ref 0–1)
BILIRUB SERPL-MCNC: 0.47 MG/DL (ref 0.2–1)
BUN SERPL-MCNC: 15 MG/DL (ref 5–25)
CALCIUM SERPL-MCNC: 9.2 MG/DL (ref 8.3–10.1)
CHLORIDE SERPL-SCNC: 104 MMOL/L (ref 100–108)
CHOLEST SERPL-MCNC: 260 MG/DL (ref 50–200)
CO2 SERPL-SCNC: 27 MMOL/L (ref 21–32)
CREAT SERPL-MCNC: 0.86 MG/DL (ref 0.6–1.3)
EOSINOPHIL # BLD AUTO: 0.25 THOUSAND/ΜL (ref 0–0.61)
EOSINOPHIL NFR BLD AUTO: 4 % (ref 0–6)
ERYTHROCYTE [DISTWIDTH] IN BLOOD BY AUTOMATED COUNT: 12.5 % (ref 11.6–15.1)
EST. AVERAGE GLUCOSE BLD GHB EST-MCNC: 134 MG/DL
GFR SERPL CREATININE-BSD FRML MDRD: 69 ML/MIN/1.73SQ M
GLUCOSE P FAST SERPL-MCNC: 127 MG/DL (ref 65–99)
HBA1C MFR BLD: 6.3 % (ref 4.2–6.3)
HCT VFR BLD AUTO: 42.1 % (ref 34.8–46.1)
HDLC SERPL-MCNC: 46 MG/DL
HGB BLD-MCNC: 13.6 G/DL (ref 11.5–15.4)
IMM GRANULOCYTES # BLD AUTO: 0.01 THOUSAND/UL (ref 0–0.2)
IMM GRANULOCYTES NFR BLD AUTO: 0 % (ref 0–2)
LDLC SERPL CALC-MCNC: 158 MG/DL (ref 0–100)
LYMPHOCYTES # BLD AUTO: 2.43 THOUSANDS/ΜL (ref 0.6–4.47)
LYMPHOCYTES NFR BLD AUTO: 41 % (ref 14–44)
MCH RBC QN AUTO: 32 PG (ref 26.8–34.3)
MCHC RBC AUTO-ENTMCNC: 32.3 G/DL (ref 31.4–37.4)
MCV RBC AUTO: 99 FL (ref 82–98)
MONOCYTES # BLD AUTO: 0.41 THOUSAND/ΜL (ref 0.17–1.22)
MONOCYTES NFR BLD AUTO: 7 % (ref 4–12)
NEUTROPHILS # BLD AUTO: 2.73 THOUSANDS/ΜL (ref 1.85–7.62)
NEUTS SEG NFR BLD AUTO: 47 % (ref 43–75)
NRBC BLD AUTO-RTO: 0 /100 WBCS
PLATELET # BLD AUTO: 260 THOUSANDS/UL (ref 149–390)
PMV BLD AUTO: 10.5 FL (ref 8.9–12.7)
POTASSIUM SERPL-SCNC: 4.1 MMOL/L (ref 3.5–5.3)
PROT SERPL-MCNC: 7.4 G/DL (ref 6.4–8.2)
RBC # BLD AUTO: 4.25 MILLION/UL (ref 3.81–5.12)
SODIUM SERPL-SCNC: 139 MMOL/L (ref 136–145)
T4 FREE SERPL-MCNC: 0.86 NG/DL (ref 0.76–1.46)
TRIGL SERPL-MCNC: 279 MG/DL
TSH SERPL DL<=0.05 MIU/L-ACNC: 3.79 UIU/ML (ref 0.36–3.74)
WBC # BLD AUTO: 5.91 THOUSAND/UL (ref 4.31–10.16)

## 2019-10-28 PROCEDURE — 84439 ASSAY OF FREE THYROXINE: CPT

## 2019-10-28 PROCEDURE — 84443 ASSAY THYROID STIM HORMONE: CPT

## 2019-10-28 PROCEDURE — 85025 COMPLETE CBC W/AUTO DIFF WBC: CPT

## 2019-10-28 PROCEDURE — 36415 COLL VENOUS BLD VENIPUNCTURE: CPT

## 2019-10-28 PROCEDURE — 83036 HEMOGLOBIN GLYCOSYLATED A1C: CPT

## 2019-10-28 PROCEDURE — 80061 LIPID PANEL: CPT

## 2019-10-28 PROCEDURE — 80053 COMPREHEN METABOLIC PANEL: CPT

## 2019-10-29 NOTE — TELEPHONE ENCOUNTER
----- Message from Erinn Del Rosario, DO sent at 10/28/2019  7:31 PM EDT -----  Call patient with lab results blood sugar is still elevated at 127 (normal less than 100)  Cholesterol is still high at 260 ( normal is less than 200)  Thyroid is borderline low normal   I would recommend improving diet ( decrease sugars in diet, decrease animal fats)  Continue same medications for now  I would like to repeat her labs in 6 months followed by appointment (I placed lab orders for April for her to get at [facility] lab)

## 2019-11-26 DIAGNOSIS — G43.809 OTHER MIGRAINE WITHOUT STATUS MIGRAINOSUS, NOT INTRACTABLE: ICD-10-CM

## 2019-11-27 RX ORDER — SUMATRIPTAN 100 MG/1
100 TABLET, FILM COATED ORAL ONCE AS NEEDED
Qty: 27 TABLET | Refills: 0 | Status: SHIPPED | OUTPATIENT
Start: 2019-11-27 | End: 2020-02-17

## 2019-12-09 DIAGNOSIS — G47.9 SLEEP DISORDER: ICD-10-CM

## 2019-12-10 RX ORDER — ZOLPIDEM TARTRATE 10 MG/1
10 TABLET ORAL
Qty: 90 TABLET | Refills: 0 | Status: SHIPPED | OUTPATIENT
Start: 2019-12-10 | End: 2020-03-25 | Stop reason: SDUPTHER

## 2020-01-22 DIAGNOSIS — E03.9 HYPOTHYROIDISM, UNSPECIFIED TYPE: ICD-10-CM

## 2020-01-22 RX ORDER — LEVOTHYROXINE SODIUM 0.05 MG/1
TABLET ORAL
Qty: 90 TABLET | Refills: 0 | Status: SHIPPED | OUTPATIENT
Start: 2020-01-22 | End: 2020-03-25 | Stop reason: SDUPTHER

## 2020-02-16 DIAGNOSIS — G43.809 OTHER MIGRAINE WITHOUT STATUS MIGRAINOSUS, NOT INTRACTABLE: ICD-10-CM

## 2020-02-17 RX ORDER — SUMATRIPTAN 100 MG/1
100 TABLET, FILM COATED ORAL ONCE AS NEEDED
Qty: 27 TABLET | Refills: 0 | Status: SHIPPED | OUTPATIENT
Start: 2020-02-17 | End: 2020-07-26

## 2020-02-24 DIAGNOSIS — I10 ESSENTIAL HYPERTENSION: ICD-10-CM

## 2020-02-24 RX ORDER — LOSARTAN POTASSIUM 100 MG/1
TABLET ORAL
Qty: 90 TABLET | Refills: 1 | Status: SHIPPED | OUTPATIENT
Start: 2020-02-24 | End: 2020-08-14

## 2020-03-25 DIAGNOSIS — G47.9 SLEEP DISORDER: ICD-10-CM

## 2020-03-25 DIAGNOSIS — E03.9 HYPOTHYROIDISM, UNSPECIFIED TYPE: ICD-10-CM

## 2020-03-25 RX ORDER — LEVOTHYROXINE SODIUM 0.05 MG/1
50 TABLET ORAL DAILY
Qty: 90 TABLET | Refills: 0 | Status: SHIPPED | OUTPATIENT
Start: 2020-03-25 | End: 2020-04-20

## 2020-03-25 RX ORDER — ZOLPIDEM TARTRATE 10 MG/1
10 TABLET ORAL
Qty: 90 TABLET | Refills: 0 | Status: SHIPPED | OUTPATIENT
Start: 2020-03-25 | End: 2020-06-25 | Stop reason: SDUPTHER

## 2020-04-01 ENCOUNTER — TELEMEDICINE (OUTPATIENT)
Dept: FAMILY MEDICINE CLINIC | Facility: CLINIC | Age: 70
End: 2020-04-01
Payer: MEDICARE

## 2020-04-01 DIAGNOSIS — K21.9 GASTROESOPHAGEAL REFLUX DISEASE WITHOUT ESOPHAGITIS: ICD-10-CM

## 2020-04-01 DIAGNOSIS — K50.919 CROHN'S DISEASE WITH COMPLICATION, UNSPECIFIED GASTROINTESTINAL TRACT LOCATION (HCC): ICD-10-CM

## 2020-04-01 DIAGNOSIS — R73.9 ELEVATED BLOOD SUGAR: ICD-10-CM

## 2020-04-01 DIAGNOSIS — B02.29 POST HERPETIC NEURALGIA: ICD-10-CM

## 2020-04-01 DIAGNOSIS — G43.809 OTHER MIGRAINE WITHOUT STATUS MIGRAINOSUS, NOT INTRACTABLE: ICD-10-CM

## 2020-04-01 DIAGNOSIS — E03.9 PRIMARY HYPOTHYROIDISM: ICD-10-CM

## 2020-04-01 DIAGNOSIS — G47.00 INSOMNIA, UNSPECIFIED TYPE: Primary | ICD-10-CM

## 2020-04-01 DIAGNOSIS — I10 ESSENTIAL HYPERTENSION: ICD-10-CM

## 2020-04-01 PROCEDURE — 99442 PR PHYS/QHP TELEPHONE EVALUATION 11-20 MIN: CPT | Performed by: FAMILY MEDICINE

## 2020-04-19 DIAGNOSIS — E03.9 HYPOTHYROIDISM, UNSPECIFIED TYPE: ICD-10-CM

## 2020-04-20 RX ORDER — LEVOTHYROXINE SODIUM 0.05 MG/1
TABLET ORAL
Qty: 90 TABLET | Refills: 0 | Status: SHIPPED | OUTPATIENT
Start: 2020-04-20 | End: 2020-06-25 | Stop reason: SDUPTHER

## 2020-06-25 DIAGNOSIS — E03.9 HYPOTHYROIDISM, UNSPECIFIED TYPE: ICD-10-CM

## 2020-06-25 DIAGNOSIS — G47.9 SLEEP DISORDER: ICD-10-CM

## 2020-06-25 RX ORDER — ZOLPIDEM TARTRATE 10 MG/1
10 TABLET ORAL
Qty: 90 TABLET | Refills: 0 | Status: SHIPPED | OUTPATIENT
Start: 2020-06-25 | End: 2020-10-07 | Stop reason: SDUPTHER

## 2020-06-25 RX ORDER — LEVOTHYROXINE SODIUM 0.05 MG/1
50 TABLET ORAL DAILY
Qty: 90 TABLET | Refills: 1 | Status: SHIPPED | OUTPATIENT
Start: 2020-06-25 | End: 2020-10-07 | Stop reason: SDUPTHER

## 2020-07-25 DIAGNOSIS — G43.809 OTHER MIGRAINE WITHOUT STATUS MIGRAINOSUS, NOT INTRACTABLE: ICD-10-CM

## 2020-07-26 RX ORDER — SUMATRIPTAN 100 MG/1
100 TABLET, FILM COATED ORAL ONCE AS NEEDED
Qty: 27 TABLET | Refills: 0 | Status: SHIPPED | OUTPATIENT
Start: 2020-07-26 | End: 2021-01-25

## 2020-08-14 DIAGNOSIS — I10 ESSENTIAL HYPERTENSION: ICD-10-CM

## 2020-08-14 RX ORDER — LOSARTAN POTASSIUM 100 MG/1
TABLET ORAL
Qty: 90 TABLET | Refills: 1 | Status: SHIPPED | OUTPATIENT
Start: 2020-08-14 | End: 2020-10-07 | Stop reason: SDUPTHER

## 2020-10-07 DIAGNOSIS — K21.9 GASTROESOPHAGEAL REFLUX DISEASE WITHOUT ESOPHAGITIS: Primary | ICD-10-CM

## 2020-10-07 DIAGNOSIS — I10 ESSENTIAL HYPERTENSION: ICD-10-CM

## 2020-10-07 DIAGNOSIS — G47.9 SLEEP DISORDER: ICD-10-CM

## 2020-10-07 DIAGNOSIS — E03.9 HYPOTHYROIDISM, UNSPECIFIED TYPE: ICD-10-CM

## 2020-10-07 RX ORDER — LEVOTHYROXINE SODIUM 0.05 MG/1
50 TABLET ORAL DAILY
Qty: 90 TABLET | Refills: 0 | Status: SHIPPED | OUTPATIENT
Start: 2020-10-07

## 2020-10-07 RX ORDER — ZOLPIDEM TARTRATE 10 MG/1
10 TABLET ORAL
Qty: 90 TABLET | Refills: 0 | Status: SHIPPED | OUTPATIENT
Start: 2020-10-07

## 2020-10-07 RX ORDER — LOSARTAN POTASSIUM 100 MG/1
100 TABLET ORAL DAILY
Qty: 90 TABLET | Refills: 0 | Status: SHIPPED | OUTPATIENT
Start: 2020-10-07

## 2020-10-07 RX ORDER — ESOMEPRAZOLE MAGNESIUM 40 MG/1
40 CAPSULE, DELAYED RELEASE ORAL 2 TIMES DAILY
Qty: 180 CAPSULE | Refills: 0 | Status: SHIPPED | OUTPATIENT
Start: 2020-10-07 | End: 2020-10-30 | Stop reason: SDUPTHER

## 2020-10-19 ENCOUNTER — TELEPHONE (OUTPATIENT)
Dept: FAMILY MEDICINE CLINIC | Facility: CLINIC | Age: 70
End: 2020-10-19

## 2021-01-24 DIAGNOSIS — G43.809 OTHER MIGRAINE WITHOUT STATUS MIGRAINOSUS, NOT INTRACTABLE: ICD-10-CM

## 2021-01-25 RX ORDER — SUMATRIPTAN 100 MG/1
100 TABLET, FILM COATED ORAL ONCE AS NEEDED
Qty: 27 TABLET | Refills: 0 | Status: SHIPPED | OUTPATIENT
Start: 2021-01-25

## 2021-05-11 ENCOUNTER — TELEPHONE (OUTPATIENT)
Dept: FAMILY MEDICINE CLINIC | Facility: CLINIC | Age: 71
End: 2021-05-11

## 2021-05-11 NOTE — TELEPHONE ENCOUNTER
Called pt overdue for visit  Pt stated her and spouse moved to [place] and est care with [id number]  from [place]  Please remove PCP